# Patient Record
Sex: FEMALE | Race: WHITE | NOT HISPANIC OR LATINO | Employment: OTHER | URBAN - METROPOLITAN AREA
[De-identification: names, ages, dates, MRNs, and addresses within clinical notes are randomized per-mention and may not be internally consistent; named-entity substitution may affect disease eponyms.]

---

## 2017-06-23 DIAGNOSIS — Z79.890 POSTMENOPAUSAL HRT (HORMONE REPLACEMENT THERAPY): ICD-10-CM

## 2017-06-26 RX ORDER — ESTRADIOL 2 MG/1
TABLET ORAL
Qty: 30 TABLET | Refills: 0 | Status: SHIPPED | OUTPATIENT
Start: 2017-06-26 | End: 2017-07-17 | Stop reason: SDUPTHER

## 2017-07-17 ENCOUNTER — OFFICE VISIT (OUTPATIENT)
Dept: OBSTETRICS AND GYNECOLOGY | Facility: CLINIC | Age: 52
End: 2017-07-17

## 2017-07-17 VITALS
DIASTOLIC BLOOD PRESSURE: 80 MMHG | WEIGHT: 190.8 LBS | HEIGHT: 64 IN | SYSTOLIC BLOOD PRESSURE: 118 MMHG | BODY MASS INDEX: 32.58 KG/M2

## 2017-07-17 DIAGNOSIS — Z01.419 ENCOUNTER FOR GYNECOLOGICAL EXAMINATION WITHOUT ABNORMAL FINDING: ICD-10-CM

## 2017-07-17 DIAGNOSIS — N31.9 NEUROGENIC BLADDER: ICD-10-CM

## 2017-07-17 DIAGNOSIS — B37.31 VAGINAL CANDIDIASIS: ICD-10-CM

## 2017-07-17 DIAGNOSIS — N60.11 FIBROCYSTIC BREAST CHANGES, BILATERAL: ICD-10-CM

## 2017-07-17 DIAGNOSIS — N60.12 FIBROCYSTIC BREAST CHANGES, BILATERAL: ICD-10-CM

## 2017-07-17 DIAGNOSIS — Z79.890 POSTMENOPAUSAL HRT (HORMONE REPLACEMENT THERAPY): Primary | ICD-10-CM

## 2017-07-17 PROCEDURE — 99396 PREV VISIT EST AGE 40-64: CPT | Performed by: OBSTETRICS & GYNECOLOGY

## 2017-07-17 PROCEDURE — 83986 ASSAY PH BODY FLUID NOS: CPT | Performed by: OBSTETRICS & GYNECOLOGY

## 2017-07-17 PROCEDURE — 87210 SMEAR WET MOUNT SALINE/INK: CPT | Performed by: OBSTETRICS & GYNECOLOGY

## 2017-07-17 RX ORDER — FLUCONAZOLE 150 MG/1
150 TABLET ORAL EVERY OTHER DAY
Qty: 3 TABLET | Refills: 1 | Status: SHIPPED | OUTPATIENT
Start: 2017-07-17 | End: 2017-07-22

## 2017-07-17 RX ORDER — CHLORHEXIDINE GLUCONATE 0.12 MG/ML
RINSE ORAL
Refills: 0 | COMMUNITY
Start: 2017-06-02 | End: 2018-07-24

## 2017-07-17 RX ORDER — DOXYCYCLINE HYCLATE 100 MG/1
1 CAPSULE ORAL 2 TIMES DAILY
Refills: 0 | COMMUNITY
Start: 2017-07-04 | End: 2018-07-24

## 2017-07-17 RX ORDER — ESTRADIOL 2 MG/1
2 TABLET ORAL DAILY
Qty: 90 TABLET | Refills: 3 | Status: SHIPPED | OUTPATIENT
Start: 2017-07-17 | End: 2018-07-24 | Stop reason: DRUGHIGH

## 2017-07-17 NOTE — PROGRESS NOTES
"   Chief Complaint   Patient presents with   • Gynecologic Exam   • Med Refill       Padmini Cochran is a 52 y.o. year old  presenting to be seen for her annual exam.This patient has a history of a robotically-assisted T LH/BSO/VCS/posterior repair.  She has a neurogenic bladder and does self catheterization without a history of cystitis.  She is currently on antibiotics for an infected sebaceous cyst of the right chest wall.  She complains of vaginal irritation and discharge.    SCREENING TESTS    Year 2012   Age                         PAP                         HPV high risk                         Mammogram     benign                    LUCIO score                         Breast MRI                         Lipids                         Vitamin D                         Colonoscopy                         DEXA  Frax (hip/any)                         Ovarian Screen                           Enter the month test was performed.  If month not known, enter \"X'  · Black numbers = normal results  · Red numbers = abnormal results  · Black X = patient reported normal  · Red X - patient reported abnormal      Referred by:    Profession:    Other info:        She exercises regularly: yes.  She wears her seat belt: yes.  She has concerns about domestic violence: no.  She has noticed changes in height: no    GYN screening history:  · Last mammogram: was done on approximately 2016 and the result was: Birads II (Benign findings)..    No Additional Complaints Reported    The following portions of the patient's history were reviewed and updated as appropriate:vital signs and   She  does not have any pertinent problems on file.  She  has a past surgical history that includes Laparoscopic tubal ligation; Spinal cord untethering; Enterocele repair; Posterior repair; Perineoplasty; Endometrial ablation; Dilation and " curettage of uterus; Pelvic laparoscopy; total laparoscopic hysterectomy salpingo oophorectomy (07/2014); and Oophorectomy (Bilateral, 07/2014).  Her family history includes Alzheimer's disease in her paternal grandmother; Cancer in her maternal grandmother; Colon cancer in her maternal grandmother; Coronary artery disease in her mother; Diabetes in her mother; Hypertension in her father; Lymphoma in her father; Pancreatic cancer in her mother. There is no history of Breast cancer or Ovarian cancer.  She  reports that she has never smoked. She has never used smokeless tobacco. She reports that she does not drink alcohol or use illicit drugs.  Current Outpatient Prescriptions   Medication Sig Dispense Refill   • atorvastatin (LIPITOR) 40 MG tablet      • buPROPion (WELLBUTRIN) 75 MG tablet      • calcium carbonate (OS-ANNITA) 600 MG tablet Take 600 mg by mouth daily.     • carvedilol (COREG) 6.25 MG tablet      • chlorhexidine (PERIDEX) 0.12 % solution Rinse with 1/2 ounce by mouth for 30 seconds then spit out. use twice a day  0   • cholecalciferol (VITAMIN D3) 1000 UNITS tablet Take 1,000 Units by mouth daily.     • diazepam (VALIUM) 5 MG tablet   0   • doxycycline (VIBRAMYCIN) 100 MG capsule Take 1 capsule by mouth 2 (Two) Times a Day.  0   • estradiol (ESTRACE) 2 MG tablet Take 1 tablet by mouth Daily. 90 tablet 3   • fluconazole (DIFLUCAN) 150 MG tablet Take 1 tablet by mouth Every Other Day for 3 doses. 1 Every other day 3 tablet 1   • omeprazole (PriLOSEC) 20 MG capsule        No current facility-administered medications for this visit.      She is allergic to levaquin [levofloxacin]..    Review of Systems  A comprehensive review of systems was taken.  Constitutional: negative for fever, chills, activity change, appetite change, fatigue and unexpected weight change.  Respiratory: negative  Cardiovascular: negative  Gastrointestinal: negative  Genitourinary:positive for urinary  "retention  Musculoskeletal:negative  Behavioral/Psych: negative       /80  Ht 64\" (162.6 cm)  Wt 190 lb 12.8 oz (86.5 kg)  LMP  (LMP Unknown)  BMI 32.75 kg/m2    Physical Exam    General:  alert; cooperative; well developed; well nourished   Skin:  No suspicious lesions seen   Thyroid: normal to inspection and palpation   Lungs:  clear to auscultation bilaterally   Heart:  regular rate and rhythm, S1, S2 normal, no murmur, click, rub or gallop   Breasts:  Examined in supine position  Symmetric without masses or skin dimpling  Nipples normal without inversion, lesions or discharge  There are no palpable axillary nodes  Fibrocystic changes are present both breasts without a discrete mass   Abdomen: soft, non-tender; no masses  no umbilical or inginual hernias are present  no hepato-splenomegaly   Pelvis: Clinical staff was present for exam  External genitalia:  normal appearance of the external genitalia including Bartholin's and [place]'s glands.  Vaginal:  discharge present -  white and thick; pH = 4.0 wet prep done: pseudo-hyphae are present and free buds are present;  Cervix:  absent.  Uterus:  absent.  Adnexa:  absent, bilateral.  Rectal:  anus visually normal appearing. recto-vaginal exam unremarkable and confirms findings;     Lab Review   No data reviewed    Imaging  Mammogram results- benign         ASSESSMENT  Problems Addressed this Visit        Genitourinary    Neurogenic bladder    Postmenopausal HRT (hormone replacement therapy) - Primary    Relevant Medications    estradiol (ESTRACE) 2 MG tablet       Other    Fibrocystic breast changes, bilateral      Other Visit Diagnoses     Encounter for gynecological examination without abnormal finding        Vaginal candidiasis        Relevant Medications    fluconazole (DIFLUCAN) 150 MG tablet          PLAN    Medications prescribed this encounter:    New Medications Ordered This Visit   Medications   • chlorhexidine (PERIDEX) 0.12 % solution     Sig: " Rinse with 1/2 ounce by mouth for 30 seconds then spit out. use twice a day     Refill:  0   • doxycycline (VIBRAMYCIN) 100 MG capsule     Sig: Take 1 capsule by mouth 2 (Two) Times a Day.     Refill:  0   • estradiol (ESTRACE) 2 MG tablet     Sig: Take 1 tablet by mouth Daily.     Dispense:  90 tablet     Refill:  3   • fluconazole (DIFLUCAN) 150 MG tablet     Sig: Take 1 tablet by mouth Every Other Day for 3 doses. 1 Every other day     Dispense:  3 tablet     Refill:  1     QOD   ·   · Calcium, 600 mg/ Vit. D, 400 IU daily; regular weight-bearing exercise  · Follow up: 12 month(s)  *Please note that portions of this documentation may have been completed with a voice recognition program.  Efforts were made to edit this dictation, but occasional words may have been mistranscribed.       This note was electronically signed.    PACO Padilla MD  July 17, 2017  3:25 PM

## 2017-10-02 ENCOUNTER — TRANSCRIBE ORDERS (OUTPATIENT)
Dept: ADMINISTRATIVE | Facility: HOSPITAL | Age: 52
End: 2017-10-02

## 2017-10-02 DIAGNOSIS — Z12.39 BREAST SCREENING: Primary | ICD-10-CM

## 2017-11-10 ENCOUNTER — HOSPITAL ENCOUNTER (OUTPATIENT)
Dept: MAMMOGRAPHY | Facility: HOSPITAL | Age: 52
Discharge: HOME | End: 2017-11-10
Attending: OBSTETRICS & GYNECOLOGY | Admitting: OBSTETRICS & GYNECOLOGY

## 2017-11-10 DIAGNOSIS — Z12.39 BREAST SCREENING: ICD-10-CM

## 2017-11-10 PROCEDURE — 77063 BREAST TOMOSYNTHESIS BI: CPT

## 2017-11-10 PROCEDURE — 77067 SCR MAMMO BI INCL CAD: CPT | Performed by: RADIOLOGY

## 2017-11-10 PROCEDURE — G0202 SCR MAMMO BI INCL CAD: HCPCS

## 2017-11-10 PROCEDURE — 77063 BREAST TOMOSYNTHESIS BI: CPT | Performed by: RADIOLOGY

## 2018-07-24 ENCOUNTER — OFFICE VISIT (OUTPATIENT)
Dept: OBSTETRICS AND GYNECOLOGY | Facility: CLINIC | Age: 53
End: 2018-07-24

## 2018-07-24 VITALS
WEIGHT: 167.6 LBS | SYSTOLIC BLOOD PRESSURE: 128 MMHG | BODY MASS INDEX: 28.61 KG/M2 | DIASTOLIC BLOOD PRESSURE: 82 MMHG | HEIGHT: 64 IN

## 2018-07-24 DIAGNOSIS — Z01.419 ENCOUNTER FOR GYNECOLOGICAL EXAMINATION WITHOUT ABNORMAL FINDING: ICD-10-CM

## 2018-07-24 DIAGNOSIS — Z79.890 POSTMENOPAUSAL HRT (HORMONE REPLACEMENT THERAPY): Primary | ICD-10-CM

## 2018-07-24 PROCEDURE — 99396 PREV VISIT EST AGE 40-64: CPT | Performed by: OBSTETRICS & GYNECOLOGY

## 2018-07-24 RX ORDER — CHLORAL HYDRATE 500 MG
1000 CAPSULE ORAL
COMMUNITY
End: 2020-08-05

## 2018-07-24 RX ORDER — ESTRADIOL 1 MG/1
1.5 TABLET ORAL DAILY
Qty: 45 TABLET | Refills: 12 | Status: SHIPPED | OUTPATIENT
Start: 2018-07-24 | End: 2018-08-23

## 2018-07-24 NOTE — PROGRESS NOTES
Chief Complaint   Patient presents with   • Gynecologic Exam   • Med Refill       Padmini Cochran is a 53 y.o. year old  presenting to be seen for her annual exam.  This patient has a history of tubal sterilization; hysteroscopy/D&C/endometrial ablation; a robotically-assisted TLH/BSO/VCS; and a posterior/enterocele repair.  She currently takes estradiol, 2 mg daily and has no menopausal symptoms.  She denies side effects on estrogen replacement therapy.  She denies urinary symptoms.  She is scheduled for a partial colectomy for a sessile polyp of the ascending colon.    SCREENING TESTS    Year 2012   Age                         PAP  Neg.                       HPV high risk                         Mammogram     benign benign                   LUCIO score                         Breast MRI                         Lipids                         Vitamin D                         Colonoscopy                         DEXA  Frax (hip/any)                         Ovarian Screen                             She exercises regularly: yes.  She wears her seat belt: yes.  She has concerns about domestic violence: no.  She has noticed changes in height: no    GYN screening history:  · Last mammogram: was done on approximately 11/10/2017 and the result was: Birads I (Normal)..    No Additional Complaints Reported    The following portions of the patient's history were reviewed and updated as appropriate:vital signs and   She  has a past medical history of Colon polyp; Depression; Fibrocystic breast changes, bilateral; GERD (gastroesophageal reflux disease); Hyperlipidemia; Hypertension; Neurogenic bladder; Tethered spinal cord (CMS/HCC); and Urinary retention.  She  does not have any pertinent problems on file.  She  has a past surgical history that includes Laparoscopic tubal ligation; Spinal cord untethering;  "Enterocele repair; Posterior repair; Perineoplasty; Endometrial ablation; Dilation and curettage of uterus; Pelvic laparoscopy; total laparoscopic hysterectomy salpingo oophorectomy (07/2014); and Oophorectomy (Bilateral, 07/2014).  Her family history includes Alzheimer's disease in her paternal grandmother; Cancer in her maternal grandmother; Colon cancer in her maternal grandmother; Coronary artery disease in her mother; Diabetes in her mother; Hypertension in her father; Lymphoma in her father; Pancreatic cancer in her mother.  She  reports that she has never smoked. She has never used smokeless tobacco. She reports that she does not drink alcohol or use drugs.  Current Outpatient Prescriptions   Medication Sig Dispense Refill   • Omega-3 Fatty Acids (FISH OIL) 1000 MG capsule capsule Take 1,000 mg by mouth Daily With Breakfast.     • atorvastatin (LIPITOR) 40 MG tablet      • buPROPion (WELLBUTRIN) 75 MG tablet      • calcium carbonate (OS-ANNITA) 600 MG tablet Take 600 mg by mouth daily.     • carvedilol (COREG) 6.25 MG tablet      • cholecalciferol (VITAMIN D3) 1000 UNITS tablet Take 1,000 Units by mouth daily.     • diazepam (VALIUM) 5 MG tablet   0   • estradiol (ESTRACE) 1 MG tablet Take 1.5 tablets by mouth Daily for 30 days. 45 tablet 12   • omeprazole (PriLOSEC) 20 MG capsule        No current facility-administered medications for this visit.      She is allergic to levaquin [levofloxacin]..    Review of Systems  A comprehensive review of systems was taken.  Constitutional: negative for fever, chills, activity change, appetite change, fatigue and unexpected weight change.  Respiratory: negative  Cardiovascular: negative  Gastrointestinal: negative  Genitourinary:negative  Musculoskeletal:negative  Behavioral/Psych: negative       /82   Ht 162.6 cm (64\")   Wt 76 kg (167 lb 9.6 oz)   LMP  (LMP Unknown)   BMI 28.77 kg/m²     Physical Exam    General:  alert; cooperative; well developed; well " nourished   Skin:  No suspicious lesions seen   Thyroid: normal to inspection and palpation   Lungs:  clear to auscultation bilaterally   Heart:  regular rate and rhythm, S1, S2 normal, no murmur, click, rub or gallop   Breasts:  Examined in supine position  Symmetric without masses or skin dimpling  Nipples normal without inversion, lesions or discharge  There are no palpable axillary nodes   Abdomen: soft, non-tender; no masses  no umbilical or inginual hernias are present  no hepato-splenomegaly   Pelvis: Clinical staff was present for exam  External genitalia:  normal appearance of the external genitalia including Bartholin's and [place]'s glands.  Vaginal:  normal pink mucosa without prolapse or lesions.  Cervix:  absent.  Uterus:  absent.  Adnexa:  absent, bilateral.  Rectal:  anus visually normal appearing. recto-vaginal exam unremarkable and confirms findings;     Lab Review   No data reviewed    Imaging  Mammogram results- Birads I         ASSESSMENT  Problems Addressed this Visit        Genitourinary    Postmenopausal HRT (hormone replacement therapy) - Primary    Relevant Medications    estradiol (ESTRACE) 1 MG tablet      Other Visit Diagnoses     Encounter for gynecological examination without abnormal finding        Relevant Orders    Liquid-based Pap Smear, Screening          PLAN    Medications prescribed this encounter:    New Medications Ordered This Visit   Medications   • estradiol (ESTRACE) 1 MG tablet     Sig: Take 1.5 tablets by mouth Daily for 30 days.     Dispense:  45 tablet     Refill:  12   · Pap test of vagina done  · Monthly self breast assessment and annual breast imaging  · The patient will contact me if her decreased dose of estradiol is not effective in relieving symptoms  · Calcium, 600 mg/ Vit. D, 400 IU daily; regular weight-bearing exercise  · Follow up: 12 month(s)  *Please note that portions of this documentation may have been completed with a voice recognition program.  Efforts  were made to edit this dictation, but occasional words may have been mistranscribed.       This note was electronically signed.    PACO Padilla MD  July 24, 2018  10:59 AM

## 2018-10-29 ENCOUNTER — TRANSCRIBE ORDERS (OUTPATIENT)
Dept: OBSTETRICS AND GYNECOLOGY | Facility: CLINIC | Age: 53
End: 2018-10-29

## 2018-10-29 DIAGNOSIS — Z12.31 VISIT FOR SCREENING MAMMOGRAM: Primary | ICD-10-CM

## 2018-12-06 ENCOUNTER — APPOINTMENT (OUTPATIENT)
Dept: MAMMOGRAPHY | Facility: HOSPITAL | Age: 53
End: 2018-12-06
Attending: OBSTETRICS & GYNECOLOGY

## 2018-12-07 ENCOUNTER — HOSPITAL ENCOUNTER (OUTPATIENT)
Dept: MAMMOGRAPHY | Facility: HOSPITAL | Age: 53
Discharge: HOME | End: 2018-12-07
Attending: OBSTETRICS & GYNECOLOGY | Admitting: OBSTETRICS & GYNECOLOGY

## 2018-12-07 DIAGNOSIS — Z12.31 VISIT FOR SCREENING MAMMOGRAM: ICD-10-CM

## 2018-12-07 PROCEDURE — 77067 SCR MAMMO BI INCL CAD: CPT

## 2018-12-07 PROCEDURE — 77063 BREAST TOMOSYNTHESIS BI: CPT

## 2018-12-07 PROCEDURE — 77067 SCR MAMMO BI INCL CAD: CPT | Performed by: RADIOLOGY

## 2018-12-07 PROCEDURE — 77063 BREAST TOMOSYNTHESIS BI: CPT | Performed by: RADIOLOGY

## 2019-01-07 RX ORDER — ATORVASTATIN CALCIUM 40 MG/1
TABLET, FILM COATED ORAL
Qty: 90 TABLET | Refills: 3 | Status: SHIPPED | OUTPATIENT
Start: 2019-01-07 | End: 2020-04-20

## 2019-01-07 RX ORDER — CARVEDILOL 6.25 MG/1
TABLET ORAL
Qty: 180 TABLET | Refills: 3 | Status: SHIPPED | OUTPATIENT
Start: 2019-01-07 | End: 2020-04-20

## 2019-01-07 RX ORDER — BUPROPION HYDROCHLORIDE 75 MG/1
TABLET ORAL
Qty: 180 TABLET | Refills: 3 | Status: SHIPPED | OUTPATIENT
Start: 2019-01-07 | End: 2020-02-18 | Stop reason: SDUPTHER

## 2019-01-28 PROBLEM — F33.0 MAJOR DEPRESSIVE DISORDER, RECURRENT, MILD (HCC): Status: ACTIVE | Noted: 2019-01-28

## 2019-01-28 PROBLEM — K21.9 GASTROESOPHAGEAL REFLUX DISEASE WITHOUT ESOPHAGITIS: Status: ACTIVE | Noted: 2019-01-28

## 2019-01-28 PROBLEM — I10 BENIGN ESSENTIAL HYPERTENSION: Status: ACTIVE | Noted: 2019-01-28

## 2019-01-28 PROBLEM — F41.1 GENERALIZED ANXIETY DISORDER: Status: ACTIVE | Noted: 2019-01-28

## 2019-01-28 PROBLEM — E78.2 MIXED HYPERLIPIDEMIA: Status: ACTIVE | Noted: 2019-01-28

## 2019-01-28 PROBLEM — F41.9 ANXIETY: Status: ACTIVE | Noted: 2019-01-28

## 2019-01-29 ENCOUNTER — OFFICE VISIT (OUTPATIENT)
Dept: INTERNAL MEDICINE | Facility: CLINIC | Age: 54
End: 2019-01-29

## 2019-01-29 VITALS
SYSTOLIC BLOOD PRESSURE: 130 MMHG | BODY MASS INDEX: 25.27 KG/M2 | DIASTOLIC BLOOD PRESSURE: 90 MMHG | HEIGHT: 64 IN | WEIGHT: 148 LBS | TEMPERATURE: 98 F | HEART RATE: 62 BPM

## 2019-01-29 DIAGNOSIS — N31.9 NEUROGENIC BLADDER: ICD-10-CM

## 2019-01-29 DIAGNOSIS — I10 ESSENTIAL HYPERTENSION: Primary | ICD-10-CM

## 2019-01-29 DIAGNOSIS — K21.9 GASTROESOPHAGEAL REFLUX DISEASE WITHOUT ESOPHAGITIS: ICD-10-CM

## 2019-01-29 DIAGNOSIS — E78.2 MIXED HYPERLIPIDEMIA: ICD-10-CM

## 2019-01-29 LAB
ALBUMIN SERPL-MCNC: 4.32 G/DL (ref 3.2–4.8)
ALBUMIN/GLOB SERPL: 2.1 G/DL (ref 1.5–2.5)
ALP SERPL-CCNC: 87 U/L (ref 25–100)
ALT SERPL W P-5'-P-CCNC: 22 U/L (ref 7–40)
ANION GAP SERPL CALCULATED.3IONS-SCNC: 7 MMOL/L (ref 3–11)
ARTICHOKE IGE QN: 111 MG/DL (ref 0–130)
AST SERPL-CCNC: 17 U/L (ref 0–33)
BILIRUB SERPL-MCNC: 0.5 MG/DL (ref 0.3–1.2)
BUN BLD-MCNC: 16 MG/DL (ref 9–23)
BUN/CREAT SERPL: 19.3 (ref 7–25)
CALCIUM SPEC-SCNC: 9.5 MG/DL (ref 8.7–10.4)
CHLORIDE SERPL-SCNC: 103 MMOL/L (ref 99–109)
CHOLEST SERPL-MCNC: 182 MG/DL (ref 0–200)
CO2 SERPL-SCNC: 28 MMOL/L (ref 20–31)
CREAT BLD-MCNC: 0.83 MG/DL (ref 0.6–1.3)
GFR SERPL CREATININE-BSD FRML MDRD: 72 ML/MIN/1.73
GLOBULIN UR ELPH-MCNC: 2.1 GM/DL
GLUCOSE BLD-MCNC: 87 MG/DL (ref 70–100)
HDLC SERPL-MCNC: 48 MG/DL (ref 40–60)
POTASSIUM BLD-SCNC: 4.4 MMOL/L (ref 3.5–5.5)
PROT SERPL-MCNC: 6.4 G/DL (ref 5.7–8.2)
SODIUM BLD-SCNC: 138 MMOL/L (ref 132–146)
TRIGL SERPL-MCNC: 149 MG/DL (ref 0–150)

## 2019-01-29 PROCEDURE — 36415 COLL VENOUS BLD VENIPUNCTURE: CPT | Performed by: INTERNAL MEDICINE

## 2019-01-29 PROCEDURE — 80061 LIPID PANEL: CPT | Performed by: INTERNAL MEDICINE

## 2019-01-29 PROCEDURE — 99214 OFFICE O/P EST MOD 30 MIN: CPT | Performed by: INTERNAL MEDICINE

## 2019-01-29 PROCEDURE — 80053 COMPREHEN METABOLIC PANEL: CPT | Performed by: INTERNAL MEDICINE

## 2019-01-29 RX ORDER — ACYCLOVIR 400 MG/1
400 TABLET ORAL 3 TIMES DAILY
Qty: 15 TABLET | Refills: 2 | Status: SHIPPED | OUTPATIENT
Start: 2019-01-29 | End: 2019-04-16 | Stop reason: SDUPTHER

## 2019-01-29 RX ORDER — INFLUENZA A VIRUS A/MICHIGAN/45/2015 X-275 (H1N1) ANTIGEN (FORMALDEHYDE INACTIVATED), INFLUENZA A VIRUS A/SINGAPORE/INFIMH-16-0019/2016 IVR-186 (H3N2) ANTIGEN (FORMALDEHYDE INACTIVATED), INFLUENZA B VIRUS B/PHUKET/3073/2013 ANTIGEN (FORMALDEHYDE INACTIVATED), AND INFLUENZA B VIRUS B/MARYLAND/15/2016 BX-69A ANTIGEN (FORMALDEHYDE INACTIVATED) 15; 15; 15; 15 UG/.5ML; UG/.5ML; UG/.5ML; UG/.5ML
INJECTION, SUSPENSION INTRAMUSCULAR
Refills: 0 | COMMUNITY
Start: 2018-11-24 | End: 2019-07-30

## 2019-01-29 RX ORDER — ESTRADIOL 1 MG/1
1.5 TABLET ORAL
Refills: 1 | COMMUNITY
Start: 2019-01-26 | End: 2019-07-30 | Stop reason: SDUPTHER

## 2019-01-29 NOTE — PROGRESS NOTES
[facility]     Padmini Cochran  1965   [id number]      Patient Care Team:  Ronnie Mas MD as PCP - General (Internal Medicine)  Casey Padilla MD as Consulting Physician (Gynecology)    Chief Complaint::   Chief Complaint   Patient presents with   • Follow-up     hypertension,hyperlipidemia, depression    And 44 pound weight loss with diet and exercise.        HPI  Patient is 53-year-old female is been on a weight watcher's diet for the past year with exercise and has lost 44 pounds her BMI down to 25.4 she feels well is currently on a maintenance diet at this time.  She continues her atorvastatin 40 or Wellbutrin.  She continues her omeprazole Coreg has occasional cold sore and would like a new prescription for the acyclovir 403 times a day.    Chronic Conditions:  Patient is 3-year-old female for dressing chronic conditions #1 obesity over the past year the patient is lost approximately 44 pounds on Weight [employer] she's now on maintenance plan and doing well discussed her neurogenic bladder and need for daily catheterization with her mixed hyperlipidemia chronic stress anxiety on Wellbutrin and Valium    Patient Active Problem List   Diagnosis   • Urinary retention   • Tethered spinal cord (CMS/HCC)   • Neurogenic bladder   • Hypertension   • Hyperlipidemia   • GERD (gastroesophageal reflux disease)   • Depression   • Postmenopausal HRT (hormone replacement therapy)   • Irritable bowel syndrome with diarrhea   • Fibrocystic breast changes, bilateral   • Colon polyp   • Mixed hyperlipidemia   • Benign essential hypertension   • Generalized anxiety disorder   • Gastroesophageal reflux disease without esophagitis   • Major depressive disorder, recurrent, mild (CMS/HCC)   • Anxiety        Past Medical History:   Diagnosis Date   • Colon polyp     nodular fold sessile serrated polyp   • Depression    • GERD (gastroesophageal reflux disease)    • Hyperlipidemia    • Hypertension    •  Neurogenic bladder    • Tethered spinal cord (CMS/HCC)    • Urinary retention        Past Surgical History:   Procedure Laterality Date   • COLONOSCOPY     • DILATATION AND CURETTAGE     • ENDOMETRIAL ABLATION     • ENTEROCELE REPAIR     • LAPAROSCOPIC TUBAL LIGATION     • OOPHORECTOMY Bilateral 07/2014   • PELVIC LAPAROSCOPY     • PERINEOPLASTY     • POSTERIOR REPAIR     • SPINAL CORD UNTETHERING     • SPINE SURGERY     • TOTAL LAPAROSCOPIC HYSTERECTOMY SALPINGO OOPHORECTOMY  07/2014    BSO   • TUBAL ABDOMINAL LIGATION         Family History   Problem Relation Age of Onset   • Cancer Maternal Grandmother         COLON   • Colon cancer Maternal Grandmother    • Coronary artery disease Maternal Grandmother    • Lymphoma Father    • Hypertension Father    • Pancreatic cancer Mother    • Diabetes Mother    • Coronary artery disease Mother    • Alzheimer's disease Paternal Grandmother    • Coronary artery disease Maternal Grandfather    • Breast cancer Neg Hx    • Ovarian cancer Neg Hx        Social History     Socioeconomic History   • Marital status:      Spouse name: Not on file   • Number of children: Not on file   • Years of education: Not on file   • Highest education level: Not on file   Social Needs   • Financial resource strain: Not on file   • Food insecurity - worry: Not on file   • Food insecurity - inability: Not on file   • Transportation needs - medical: Not on file   • Transportation needs - non-medical: Not on file   Occupational History   • Not on file   Tobacco Use   • Smoking status: Never Smoker   • Smokeless tobacco: Never Used   Substance and Sexual Activity   • Alcohol use: No   • Drug use: No   • Sexual activity: Yes     Partners: Male     Birth control/protection: Surgical   Other Topics Concern   • Not on file   Social History Narrative   • Not on file       Allergies   Allergen Reactions   • Hydrochlorothiazide Other (See Comments)     Multiple symptoms   • Levaquin [Levofloxacin]  "Confusion   • Triamterene Other (See Comments)     Multiple sympmtoms       Review of Systems    HEENT: Denies headache dizziness lightheadedness syncope or concussion  NECK:  Denies neck pain stiffness or swelling or dysphagia  CHEST: Denies cough or wheeze  CARDIAC: Denies chest pain pressure tightness palpitations  ABD: Denies nausea vomiting  : Neurogenic bladder having to Self 2-3 times per day  NEURO:  Denies syncope or concussion or neuropathy  PSYCH:  Complaint of mild stress anxiety currently stable on Wellbutrin and Valium  EXTREM: Has mild arthritic change denies edema    Vital Signs  Vitals:    01/29/19 0958   BP: 130/90   BP Location: Left arm   Patient Position: Sitting   Cuff Size: Adult   Pulse: 62   Temp: 98 °F (36.7 °C)   TempSrc: Temporal   Weight: 67.1 kg (148 lb)  Comment: without shoes   Height: 162.6 cm (64.02\")   PainSc: 0-No pain         Current Outpatient Medications:   •  atorvastatin (LIPITOR) 40 MG tablet, TAKE 1 TABLET DAILY        (INCREASED DOSE), Disp: 90 tablet, Rfl: 3  •  buPROPion (WELLBUTRIN) 75 MG tablet, TAKE 2 TABLETS DAILY, Disp: 180 tablet, Rfl: 3  •  calcium carbonate (OS-ANNITA) 600 MG tablet, Take 600 mg by mouth daily., Disp: , Rfl:   •  carvedilol (COREG) 6.25 MG tablet, TAKE 1 TABLET 2 TIMES DAILYWITH FOOD, Disp: 180 tablet, Rfl: 3  •  cholecalciferol (VITAMIN D3) 1000 UNITS tablet, Take 1,000 Units by mouth daily., Disp: , Rfl:   •  diazepam (VALIUM) 5 MG tablet, , Disp: , Rfl: 0  •  estradiol (ESTRACE) 1 MG tablet, , Disp: , Rfl: 1  •  FLUZONE QUADRIVALENT 0.5 ML suspension prefilled syringe injection, inject 0.5 milliliter intramuscularly, Disp: , Rfl: 0  •  Omega-3 Fatty Acids (FISH OIL) 1000 MG capsule capsule, Take 1,000 mg by mouth Daily With Breakfast., Disp: , Rfl:   •  omeprazole (PriLOSEC) 20 MG capsule, , Disp: , Rfl:   •  acyclovir (ZOVIRAX) 400 MG tablet, Take 1 tablet by mouth 3 (Three) Times a Day. Take no more than 5 doses a day., Disp: 15 tablet, Rfl: " 2    Physical Exam:  HEENT: Pupils equal reactive no facial asymmetry  NECK:  Normal without masses or bruits  CHEST: Clear to P&A  CARDIAC: Regular rhythm without gallop rub or click  ABD: Flat soft without masses liver spleen are normal positive bowel sounds  : Deferred  NEURO:  Intact  PSYCH:  Stable stress anxiety on Wellbutrin and Valium  EXTREM: Arthritic changes no edema  Skin: Clear     Results Review:    Fasting CMP and lipid are pending  Procedures    Medication Review: Medications reviewed and noted    Patient wellness counseling  Exercise: Patient active is active walking daily to help with her weight loss encouraged continue same  Diet: Follow a Weight Watchers maintenance program  Smoking: None  Alcohol: None  Screening: Preventative screening discussed    Assessment/Plan:  #1 obesity with current weight down to 148 having lost 44 pounds in the past 1 year on Weight Watchers continue maintenance and exercise  Neurogenic bladder requiring self catheter 3 times a day  #3 chronic H S1 and prescription for acyclovir  #4 essential hypertension  #5 chronic GERD stable on omeprazole and improved since the 40 pound weight loss  #6 chronic anxiety stable on the Wellbutrin and Valium  #7 mixed hyperlipidemia stable on atorvastatin 40 fasting lab today pending    Pending fasting lab of CMP and lipid new prescription for her urinary catheters and new prescription for acyclovir see the patient back in 6 months or when necessary.  Patient Instructions   Renew prescriptions for urinary catheter and acyclovir  Fasting lipids CMP and lipid pending  Change in therapy  Continue excellent diet maintenance and lost 44 pounds at [employer]  See the patient back in 6 months or when necessary.       Plan of care reviewed with patient at the conclusion of today's visit. Education was provided regarding diagnosis, management, and any prescribed or recommended OTC medications.Patient verbalizes understanding of and  agreement with management plan.         Ronnie Mas MD

## 2019-01-29 NOTE — PATIENT INSTRUCTIONS
Renew prescriptions for urinary catheter and acyclovir  Fasting lipids CMP and lipid pending  Change in therapy  Continue excellent diet maintenance and lost 44 pounds at [employer]  See the patient back in 6 months or when necessary.

## 2019-04-17 RX ORDER — ACYCLOVIR 400 MG/1
TABLET ORAL
Qty: 15 TABLET | Refills: 2 | Status: SHIPPED | OUTPATIENT
Start: 2019-04-17 | End: 2019-07-30 | Stop reason: SDUPTHER

## 2019-07-22 PROBLEM — E66.9 OBESITY, UNSPECIFIED: Status: ACTIVE | Noted: 2019-07-22

## 2019-07-22 PROBLEM — E66.9 OBESITY (BMI 30.0-34.9): Status: ACTIVE | Noted: 2019-07-22

## 2019-07-22 PROBLEM — R07.9 CHEST PAIN, UNSPECIFIED: Status: ACTIVE | Noted: 2019-07-22

## 2019-07-22 PROBLEM — Z00.00 ENCOUNTER FOR ANNUAL HEALTH EXAMINATION: Status: ACTIVE | Noted: 2019-07-22

## 2019-07-22 PROBLEM — I73.00 RAYNAUD'S SYNDROME: Status: ACTIVE | Noted: 2019-07-22

## 2019-07-22 PROBLEM — Z86.69 HISTORY OF SPINAL CORD COMPRESSION: Status: ACTIVE | Noted: 2019-07-22

## 2019-07-22 PROBLEM — E66.811 OBESITY (BMI 30.0-34.9): Status: ACTIVE | Noted: 2019-07-22

## 2019-07-22 PROBLEM — E55.9 VITAMIN D DEFICIENCY: Status: ACTIVE | Noted: 2019-07-22

## 2019-07-22 PROBLEM — K21.00 ESOPHAGITIS, REFLUX: Status: ACTIVE | Noted: 2019-07-22

## 2019-07-30 ENCOUNTER — OFFICE VISIT (OUTPATIENT)
Dept: OBSTETRICS AND GYNECOLOGY | Facility: CLINIC | Age: 54
End: 2019-07-30

## 2019-07-30 ENCOUNTER — OFFICE VISIT (OUTPATIENT)
Dept: INTERNAL MEDICINE | Facility: CLINIC | Age: 54
End: 2019-07-30

## 2019-07-30 ENCOUNTER — LAB (OUTPATIENT)
Dept: LAB | Facility: HOSPITAL | Age: 54
End: 2019-07-30

## 2019-07-30 VITALS
BODY MASS INDEX: 25.27 KG/M2 | HEART RATE: 68 BPM | DIASTOLIC BLOOD PRESSURE: 90 MMHG | WEIGHT: 148 LBS | HEIGHT: 64 IN | SYSTOLIC BLOOD PRESSURE: 140 MMHG

## 2019-07-30 VITALS
SYSTOLIC BLOOD PRESSURE: 138 MMHG | DIASTOLIC BLOOD PRESSURE: 76 MMHG | WEIGHT: 148 LBS | HEIGHT: 64 IN | BODY MASS INDEX: 25.27 KG/M2

## 2019-07-30 DIAGNOSIS — Z01.419 ENCOUNTER FOR GYNECOLOGICAL EXAMINATION WITHOUT ABNORMAL FINDING: ICD-10-CM

## 2019-07-30 DIAGNOSIS — I10 ESSENTIAL HYPERTENSION: Primary | ICD-10-CM

## 2019-07-30 DIAGNOSIS — N60.12 FIBROCYSTIC BREAST CHANGES, BILATERAL: ICD-10-CM

## 2019-07-30 DIAGNOSIS — E78.2 MIXED HYPERLIPIDEMIA: ICD-10-CM

## 2019-07-30 DIAGNOSIS — Z79.890 POSTMENOPAUSAL HRT (HORMONE REPLACEMENT THERAPY): Primary | ICD-10-CM

## 2019-07-30 DIAGNOSIS — I10 ESSENTIAL HYPERTENSION: ICD-10-CM

## 2019-07-30 DIAGNOSIS — K58.0 IRRITABLE BOWEL SYNDROME WITH DIARRHEA: ICD-10-CM

## 2019-07-30 DIAGNOSIS — Z83.3 FAMILY HISTORY OF DIABETES MELLITUS: ICD-10-CM

## 2019-07-30 DIAGNOSIS — I73.00 RAYNAUD'S DISEASE WITHOUT GANGRENE: ICD-10-CM

## 2019-07-30 DIAGNOSIS — N81.5 VAGINAL ENTEROCELE: ICD-10-CM

## 2019-07-30 DIAGNOSIS — N31.9 NEUROGENIC BLADDER: ICD-10-CM

## 2019-07-30 DIAGNOSIS — I10 BENIGN ESSENTIAL HYPERTENSION: ICD-10-CM

## 2019-07-30 DIAGNOSIS — N60.11 FIBROCYSTIC BREAST CHANGES, BILATERAL: ICD-10-CM

## 2019-07-30 LAB
ALBUMIN SERPL-MCNC: 4.4 G/DL (ref 3.5–5.2)
ALBUMIN/GLOB SERPL: 1.3 G/DL
ALP SERPL-CCNC: 94 U/L (ref 39–117)
ALT SERPL W P-5'-P-CCNC: 19 U/L (ref 1–33)
ANION GAP SERPL CALCULATED.3IONS-SCNC: 12 MMOL/L (ref 5–15)
AST SERPL-CCNC: 16 U/L (ref 1–32)
BASOPHILS # BLD AUTO: 0.06 10*3/MM3 (ref 0–0.2)
BASOPHILS NFR BLD AUTO: 0.9 % (ref 0–1.5)
BILIRUB SERPL-MCNC: 0.2 MG/DL (ref 0.2–1.2)
BUN BLD-MCNC: 16 MG/DL (ref 6–20)
BUN/CREAT SERPL: 19.5 (ref 7–25)
CALCIUM SPEC-SCNC: 10 MG/DL (ref 8.6–10.5)
CHLORIDE SERPL-SCNC: 101 MMOL/L (ref 98–107)
CHOLEST SERPL-MCNC: 180 MG/DL (ref 0–200)
CO2 SERPL-SCNC: 28 MMOL/L (ref 22–29)
CREAT BLD-MCNC: 0.82 MG/DL (ref 0.57–1)
DEPRECATED RDW RBC AUTO: 41.9 FL (ref 37–54)
EOSINOPHIL # BLD AUTO: 0.27 10*3/MM3 (ref 0–0.4)
EOSINOPHIL NFR BLD AUTO: 4 % (ref 0.3–6.2)
ERYTHROCYTE [DISTWIDTH] IN BLOOD BY AUTOMATED COUNT: 12.1 % (ref 12.3–15.4)
GFR SERPL CREATININE-BSD FRML MDRD: 73 ML/MIN/1.73
GLOBULIN UR ELPH-MCNC: 3.5 GM/DL
GLUCOSE BLD-MCNC: 64 MG/DL (ref 65–99)
HBA1C MFR BLD: 5.7 % (ref 4.8–5.6)
HCT VFR BLD AUTO: 46 % (ref 34–46.6)
HDLC SERPL-MCNC: 57 MG/DL (ref 40–60)
HGB BLD-MCNC: 14.2 G/DL (ref 12–15.9)
IMM GRANULOCYTES # BLD AUTO: 0.02 10*3/MM3 (ref 0–0.05)
IMM GRANULOCYTES NFR BLD AUTO: 0.3 % (ref 0–0.5)
LDLC SERPL CALC-MCNC: 104 MG/DL (ref 0–100)
LDLC/HDLC SERPL: 1.82 {RATIO}
LYMPHOCYTES # BLD AUTO: 1.75 10*3/MM3 (ref 0.7–3.1)
LYMPHOCYTES NFR BLD AUTO: 25.7 % (ref 19.6–45.3)
MCH RBC QN AUTO: 28.9 PG (ref 26.6–33)
MCHC RBC AUTO-ENTMCNC: 30.9 G/DL (ref 31.5–35.7)
MCV RBC AUTO: 93.5 FL (ref 79–97)
MONOCYTES # BLD AUTO: 0.57 10*3/MM3 (ref 0.1–0.9)
MONOCYTES NFR BLD AUTO: 8.4 % (ref 5–12)
NEUTROPHILS # BLD AUTO: 4.15 10*3/MM3 (ref 1.7–7)
NEUTROPHILS NFR BLD AUTO: 60.7 % (ref 42.7–76)
NRBC BLD AUTO-RTO: 0 /100 WBC (ref 0–0.2)
PLATELET # BLD AUTO: 373 10*3/MM3 (ref 140–450)
PMV BLD AUTO: 9.5 FL (ref 6–12)
POTASSIUM BLD-SCNC: 4.5 MMOL/L (ref 3.5–5.2)
PROT SERPL-MCNC: 7.9 G/DL (ref 6–8.5)
RBC # BLD AUTO: 4.92 10*6/MM3 (ref 3.77–5.28)
SODIUM BLD-SCNC: 141 MMOL/L (ref 136–145)
TRIGL SERPL-MCNC: 97 MG/DL (ref 0–150)
TSH SERPL DL<=0.05 MIU/L-ACNC: 2.65 MIU/ML (ref 0.27–4.2)
VLDLC SERPL-MCNC: 19.4 MG/DL (ref 5–40)
WBC NRBC COR # BLD: 6.82 10*3/MM3 (ref 3.4–10.8)

## 2019-07-30 PROCEDURE — 80061 LIPID PANEL: CPT

## 2019-07-30 PROCEDURE — 99396 PREV VISIT EST AGE 40-64: CPT | Performed by: INTERNAL MEDICINE

## 2019-07-30 PROCEDURE — 84443 ASSAY THYROID STIM HORMONE: CPT

## 2019-07-30 PROCEDURE — 93000 ELECTROCARDIOGRAM COMPLETE: CPT | Performed by: INTERNAL MEDICINE

## 2019-07-30 PROCEDURE — 80053 COMPREHEN METABOLIC PANEL: CPT

## 2019-07-30 PROCEDURE — 85025 COMPLETE CBC W/AUTO DIFF WBC: CPT

## 2019-07-30 PROCEDURE — 83036 HEMOGLOBIN GLYCOSYLATED A1C: CPT

## 2019-07-30 PROCEDURE — 99396 PREV VISIT EST AGE 40-64: CPT | Performed by: OBSTETRICS & GYNECOLOGY

## 2019-07-30 RX ORDER — DIAZEPAM 5 MG/1
5 TABLET ORAL NIGHTLY PRN
Qty: 30 TABLET | Refills: 2 | Status: SHIPPED | OUTPATIENT
Start: 2019-07-30 | End: 2020-08-05 | Stop reason: SDUPTHER

## 2019-07-30 RX ORDER — ESTRADIOL 1 MG/1
1.5 TABLET ORAL DAILY
Qty: 45 TABLET | Refills: 11 | Status: SHIPPED | OUTPATIENT
Start: 2019-07-30 | End: 2020-07-29

## 2019-07-30 RX ORDER — ACYCLOVIR 400 MG/1
400 TABLET ORAL 3 TIMES DAILY
Qty: 15 TABLET | Refills: 2 | Status: SHIPPED | OUTPATIENT
Start: 2019-07-30 | End: 2020-02-28

## 2019-07-30 NOTE — PROGRESS NOTES
Chief Complaint   Patient presents with   • Gynecologic Exam     patient states that something is bulging from her vagina   • Med Refill       Padmini Cochran is a 54 y.o. year old  presenting to be seen for her annual exam.  This patient has previously had tubal sterilization; a hysteroscopy/D&C/endometrial ablation; a robotically-assisted TLH/BSO/VCS for uterine prolapse as well as a posterior/enterocele repair for a rectocele/enterocele.  The latter procedures were done in 2014.  She now complains of mild bulging in the vagina.  She has chronic constipation and has straining with bowel movements.  She has seen gastroenterology.  She denies urinary symptoms.  She currently takes estradiol, 1.5 mg daily for relief of menopausal symptoms.  She has no side effects on this medication.    SCREENING TESTS    Year 2012   Age                         PAP       Neg.                  HPV high risk                         Mammogram       benign                  LUCIO score                         Breast MRI                         Lipids                         Vitamin D                         Colonoscopy                         DEXA  Frax (hip/any)                         Ovarian Screen                             She exercises regularly: yes.  She wears her seat belt: yes.  She has concerns about domestic violence: no.  She has noticed changes in height: no    GYN screening history:  · Last mammogram: was done on approximately 2018 and the result was: Birads II (Benign findings)..    No Additional Complaints Reported    The following portions of the patient's history were reviewed and updated as appropriate:vital signs and   She  has a past medical history of Colon polyp, Depression, GERD (gastroesophageal reflux disease), Hyperlipidemia, Hypertension, Neurogenic bladder, Tethered spinal cord  (CMS/HCC), and Urinary retention.  She does not have any pertinent problems on file.  She  has a past surgical history that includes Laparoscopic tubal ligation; Spinal cord untethering; Enterocele repair; Posterior repair; Perineoplasty; Endometrial ablation; Dilation and curettage of uterus; Pelvic laparoscopy; total laparoscopic hysterectomy salpingo oophorectomy (07/2014); Oophorectomy (Bilateral, 07/2014); Colonoscopy (06/2012); Tubal ligation (04/1999); Spine surgery (2004); Laparoscopic hysterectomy (07/28/2014); and Endometrial ablation (2005).  Her family history includes Alzheimer's disease in her paternal grandmother; Cancer in her maternal grandmother; Colon cancer in her maternal grandmother; Coronary artery disease in her maternal grandfather, maternal grandmother, and mother; Diabetes in her mother; Hypertension in her father and mother; Lymphoma in her father; Migraines in her sister; Pancreatic cancer in her mother.  She  reports that she has never smoked. She has never used smokeless tobacco. She reports that she does not drink alcohol or use drugs.  Current Outpatient Medications   Medication Sig Dispense Refill   • acyclovir (ZOVIRAX) 400 MG tablet Take 1 tablet by mouth 3 (Three) Times a Day. Take no more than 5 doses a day. 15 tablet 2   • atorvastatin (LIPITOR) 40 MG tablet TAKE 1 TABLET DAILY        (INCREASED DOSE) 90 tablet 3   • buPROPion (WELLBUTRIN) 75 MG tablet TAKE 2 TABLETS DAILY 180 tablet 3   • calcium carbonate (OS-ANNITA) 600 MG tablet Take 600 mg by mouth daily.     • carvedilol (COREG) 6.25 MG tablet TAKE 1 TABLET 2 TIMES DAILYWITH FOOD 180 tablet 3   • cholecalciferol (VITAMIN D3) 1000 UNITS tablet Take 1,000 Units by mouth daily.     • diazePAM (VALIUM) 5 MG tablet Take 1 tablet by mouth At Night As Needed for Anxiety. 30 tablet 2   • estradiol (ESTRACE) 1 MG tablet Take 1.5 tablets by mouth Daily. 45 tablet 11   • Omega-3 Fatty Acids (FISH OIL) 1000 MG capsule capsule Take 1,000  "mg by mouth Daily With Breakfast.     • omeprazole (PriLOSEC) 20 MG capsule        No current facility-administered medications for this visit.      She is allergic to hydrochlorothiazide; levaquin [levofloxacin]; and triamterene..    Review of Systems  A comprehensive review of systems was taken.  Constitutional: negative for fever, chills, activity change, appetite change, fatigue and unexpected weight change.  Respiratory: negative  Cardiovascular: negative  Gastrointestinal: positive for constipation  Genitourinary:positive for bulging from vagina  Musculoskeletal:negative  Behavioral/Psych: negative       /76   Ht 162.6 cm (64\")   Wt 67.1 kg (148 lb)   Breastfeeding? No   BMI 25.40 kg/m²     Physical Exam    General:  alert; cooperative; well developed; well nourished   Skin:  No suspicious lesions seen   Thyroid: normal to inspection and palpation   Lungs:  clear to auscultation bilaterally   Heart:  regular rate and rhythm, S1, S2 normal, no murmur, click, rub or gallop   Breasts:  Examined in supine position  Symmetric without masses or skin dimpling  Nipples normal without inversion, lesions or discharge  There are no palpable axillary nodes   Abdomen: soft, non-tender; no masses  no umbilical or inguinal hernias are present  no hepato-splenomegaly   Pelvis: Clinical staff was present for exam  External genitalia:  normal appearance of the external genitalia including Bartholin's and [place]'s glands.  Vaginal:  normal pink mucosa without prolapse or lesions.  Cervix:  absent.  Uterus:  absent.  Adnexa:  absent, bilateral.  Rectal:  anus visually normal appearing. recto-vaginal exam unremarkable and confirms findings;  Rectocele GRADE 1  Enterocele GRADE 2     Lab Review   No data reviewed    Imaging  Mammogram results- Birads II         ASSESSMENT  Problems Addressed this Visit        Digestive    Vaginal enterocele       Genitourinary    Postmenopausal HRT (hormone replacement therapy) - Primary "    Relevant Medications    estradiol (ESTRACE) 1 MG tablet       Other    Fibrocystic breast changes, bilateral      Other Visit Diagnoses     Encounter for gynecological examination without abnormal finding              PLAN    Medications prescribed this encounter:    New Medications Ordered This Visit   Medications   • estradiol (ESTRACE) 1 MG tablet     Sig: Take 1.5 tablets by mouth Daily.     Dispense:  45 tablet     Refill:  11   · I have spent 12 minutes in face-to-face conversation with the patient counseling her about the recurrence of her enterocele.  I have counseled her that she must get her chronic constipation under control and avoid straining with bowel movements.  I have counseled her that if this occurs and if she can avoid doing heavy lifting; I would consider doing a repeat posterior/enterocele repair.  I have counseled her that she has a minimal rectocele and a grade 2 enterocele.  Counseled her to take Colace, 100 mg by mouth daily.  She was given pamphlets about pelvic support problems and about posterior repair.  · Calcium, 600 mg/ Vit. D, 400 IU daily; regular weight-bearing exercise  · Follow up: 12 month(s)  *Please note that portions of this documentation may have been completed with a voice recognition program.  Efforts were made to edit this dictation, but occasional words may have been mistranscribed.       This note was electronically signed.    PACO Padilla MD  July 30, 2019  12:06 PM

## 2019-07-30 NOTE — ASSESSMENT & PLAN NOTE
Patient is chronic irritable bowel with occasional diarrhea currently controlled no change in therapy  Last colonoscopy was 4/8/2019

## 2019-07-30 NOTE — ASSESSMENT & PLAN NOTE
Patient has history of mixed hyperlipidemia she is on atorvastatin 40 mg daily denies myalgia arthralgia fasting CMP and lipid are pending no change therapy.  However the patient has lost approximately 40 pounds and may need to reduce her atorvastatin.

## 2019-07-30 NOTE — ASSESSMENT & PLAN NOTE
History of essential hypertension with current blood pressure 138/76 repeated 132/76 patient is on Coreg 6.25 twice daily no change therapy.

## 2019-07-30 NOTE — PROGRESS NOTES
[facility]     Padmini Cochran  1965   [id number]      Patient Care Team:  Ronnie Mas MD as PCP - General (Internal Medicine)  Casey Padilla MD as Consulting Physician (Gynecology)    Chief Complaint::   Chief Complaint   Patient presents with   • Annual Exam     also has an appt with GYN today   • Med Refill     acyclovir, valium   • lab request     strong family history of diabetes.  Requesting A1c            HPI  Patient is a 54-year-old female with a history of at bedtime 1 oral lesions menopause urinary retention requiring self-catheterization history of mixed hyperlipidemia essential hypertension GERD and chronic stress depression she has lost a total of approximately 40 pounds since April 2018 feels well is walking and exercising she had no recent changes in medication or diet she has maintained this weight for the past 6 months at 148 pounds.      Patient Active Problem List   Diagnosis   • Urinary retention   • Tethered spinal cord (CMS/HCC)   • Neurogenic bladder   • GERD (gastroesophageal reflux disease)   • Depression   • Postmenopausal HRT (hormone replacement therapy)   • Irritable bowel syndrome with diarrhea   • Fibrocystic breast changes, bilateral   • Colon polyp   • Mixed hyperlipidemia   • Benign essential hypertension   • Generalized anxiety disorder   • Gastroesophageal reflux disease without esophagitis   • Major depressive disorder, recurrent, mild (CMS/HCC)   • Anxiety   • Esophagitis, reflux   • Obesity (BMI 30.0-34.9)   • Encounter for annual health examination   • History of spinal cord compression   • Vitamin D deficiency   • Raynaud's syndrome   • Chest pain, unspecified   • Family history of diabetes mellitus   • Hyperlipidemia   • Vaginal enterocele        Past Medical History:   Diagnosis Date   • Colon polyp     nodular fold sessile serrated polyp   • Depression    • GERD (gastroesophageal reflux disease)    • Hyperlipidemia    • Hypertension    •  Neurogenic bladder    • Tethered spinal cord (CMS/HCC)    • Urinary retention        Past Surgical History:   Procedure Laterality Date   • COLONOSCOPY  06/2012    Inflammed ICV   • DILATATION AND CURETTAGE     • ENDOMETRIAL ABLATION     • ENDOMETRIAL ABLATION  2005    UTERINE  ABLATION   • ENTEROCELE REPAIR     • LAPAROSCOPIC HYSTERECTOMY  07/28/2014   • LAPAROSCOPIC TUBAL LIGATION     • OOPHORECTOMY Bilateral 07/2014   • PELVIC LAPAROSCOPY     • PERINEOPLASTY     • POSTERIOR REPAIR     • SPINAL CORD UNTETHERING     • SPINE SURGERY  2004    SPINAL CORD SURG   • TOTAL LAPAROSCOPIC HYSTERECTOMY SALPINGO OOPHORECTOMY  07/2014    BSO   • TUBAL ABDOMINAL LIGATION  04/1999       Family History   Problem Relation Age of Onset   • Cancer Maternal Grandmother         COLON   • Colon cancer Maternal Grandmother    • Coronary artery disease Maternal Grandmother    • Lymphoma Father    • Hypertension Father    • Pancreatic cancer Mother    • Diabetes Mother    • Coronary artery disease Mother         premature; angioplasty 8 09   • Hypertension Mother    • Alzheimer's disease Paternal Grandmother    • Coronary artery disease Maternal Grandfather    • Migraines Sister    • Breast cancer Neg Hx    • Ovarian cancer Neg Hx        Social History     Socioeconomic History   • Marital status:      Spouse name: Not on file   • Number of children: Not on file   • Years of education: Not on file   • Highest education level: Not on file   Tobacco Use   • Smoking status: Never Smoker   • Smokeless tobacco: Never Used   Substance and Sexual Activity   • Alcohol use: No   • Drug use: No   • Sexual activity: Yes     Partners: Male     Birth control/protection: Surgical       Allergies   Allergen Reactions   • Hydrochlorothiazide Other (See Comments)     Multiple symptoms; DYAZIDE    • Levaquin [Levofloxacin] Confusion   • Triamterene Other (See Comments)     Multiple sympmtoms; DYAZIDE        Immunization History   Administered  "Date(s) Administered   • Flu Vaccine Intradermal Quad 18-64YR 11/24/2018   • Tdap 12/30/2015        Health Maintenance Due   Topic Date Due   • ZOSTER VACCINE (1 of 2) 03/19/2016        Review of Systems     HEENT: She denies headache or dizzy wears glasses  NECK: Denies pain stiffness swelling dysphasia or reflux  CHEST: Non-smoker denies cough wheeze or shortness of breath  CARDIAC: Denies chest pain pressure tightness or palpitations history of hypertension stable on Coreg 6.25 twice daily  ABD: Denies nausea vomiting had a colonoscopy April 8, 2019  : His urinary retention and neurogenic bladder requiring self cath 3-4 times per day  NEURO: Neurogenic bladder syncope concussion or neuropathy  PSYCH: Mild stress depression currently stable on Wellbutrin and Valium as needed  EXTREM: Mild arthritic changes without edema    Vital Signs  Vitals:    07/30/19 0917   BP: 140/90   BP Location: Left arm   Patient Position: Sitting   Cuff Size: Adult   Pulse: 68   Weight: 67.1 kg (148 lb)   Height: 162.6 cm (64\")   PainSc: 0-No pain     Body mass index is 25.4 kg/m².        Current Outpatient Medications:   •  acyclovir (ZOVIRAX) 400 MG tablet, Take 1 tablet by mouth 3 (Three) Times a Day. Take no more than 5 doses a day., Disp: 15 tablet, Rfl: 2  •  atorvastatin (LIPITOR) 40 MG tablet, TAKE 1 TABLET DAILY        (INCREASED DOSE), Disp: 90 tablet, Rfl: 3  •  buPROPion (WELLBUTRIN) 75 MG tablet, TAKE 2 TABLETS DAILY, Disp: 180 tablet, Rfl: 3  •  calcium carbonate (OS-ANNITA) 600 MG tablet, Take 600 mg by mouth daily., Disp: , Rfl:   •  carvedilol (COREG) 6.25 MG tablet, TAKE 1 TABLET 2 TIMES DAILYWITH FOOD, Disp: 180 tablet, Rfl: 3  •  cholecalciferol (VITAMIN D3) 1000 UNITS tablet, Take 1,000 Units by mouth daily., Disp: , Rfl:   •  diazePAM (VALIUM) 5 MG tablet, Take 1 tablet by mouth At Night As Needed for Anxiety., Disp: 30 tablet, Rfl: 2  •  Omega-3 Fatty Acids (FISH OIL) 1000 MG capsule capsule, Take 1,000 mg by mouth " Daily With Breakfast., Disp: , Rfl:   •  omeprazole (PriLOSEC) 20 MG capsule, , Disp: , Rfl:   •  estradiol (ESTRACE) 1 MG tablet, Take 1.5 tablets by mouth Daily., Disp: 45 tablet, Rfl: 11    Physical Exam   HEENT: Pupils equal reactive ENT clear no facial asymmetry pharynx is clear  NECK: Neck no masses bruits thyromegaly or neck vein distention  CHEST: Clear to P&A without rales wheezes or rhonchi  CARDIAC: Regular rhythm without gallop rub or click  ABD: Positive bowel sounds liver and spleen normal  :   NEURO: CNS is intact no neuropathy  PSYCH: Mild anxiety depression currently stable on Valium and Wellbutrin  EXTREM: Extremities warm and dry no edema  Skin: Clear     Results Review:    Fasting lab pending    ECG 12 Lead  Date/Time: 7/30/2019 6:21 PM  Performed by: Ronnie Mas MD  Authorized by: Ronnie Mas MD   Comparison: not compared with previous ECG   Rhythm: sinus rhythm  Rate: normal  Conduction: conduction normal  ST Segments: ST segments normal  T Waves: T waves normal  QRS axis: normal  Other: no other findings    Clinical impression: normal ECG  Comments: Sinus rhythm no ischemia        Patient Wellness Counseling:   Plan of care reviewed with patient at the conclusion of today's visit. Education was provided in regards to diagnosis, diet and exercise, cervical cancer screening, self breast exams, breast cancer screening, and the importance of yearly mammograms.   Nutrition, family planning/contraception, physical activity, healthy weight,ways to reduce stress, adequate sleep, injury prevention, misuse of tobacco, alcohol and drugs, sexual behavior and STD's, dental health, mental health, and immunizations.    Management and any prescribed or recommended OTC medications.  Patient verbalizes understanding of and agreement with management plan.      Medication Review: Medications reviewed and noted    Patient wellness counseling  Exercise: Walking exercise  Diet: Continue excellent diet and  trying to maintain weight of 148 pounds  Smoking: Non-smoker  Alcohol: None alcohol  Screening: Mammogram is scheduled for August his GYN appointment this day colonoscopy was in April 2019.    Assessment/Plan:  Problem List Items Addressed This Visit        Cardiovascular and Mediastinum    Mixed hyperlipidemia    Current Assessment & Plan     Patient has history of mixed hyperlipidemia she is on atorvastatin 40 mg daily denies myalgia arthralgia fasting CMP and lipid are pending no change therapy.  However the patient has lost approximately 40 pounds and may need to reduce her atorvastatin.         Relevant Medications    atorvastatin (LIPITOR) 40 MG tablet    Other Relevant Orders    Lipid Panel    Benign essential hypertension    Current Assessment & Plan     History of essential hypertension with current blood pressure 138/76 repeated 132/76 patient is on Coreg 6.25 twice daily no change therapy.         Relevant Medications    carvedilol (COREG) 6.25 MG tablet    Raynaud's syndrome    Current Assessment & Plan     Patient has mild Raynaud's disease that is symptomatic primarily in the winter with cold weather with some discomfort in fingers and toes encouraged no caffeine and continue all current therapy note patient is not on a calcium channel blocker.            Digestive    Irritable bowel syndrome with diarrhea    Current Assessment & Plan     Patient is chronic irritable bowel with occasional diarrhea currently controlled no change in therapy  Last colonoscopy was 4/8/2019            Genitourinary    Neurogenic bladder    Overview     Patient has neurogenic bladder requiring self catheter catheterization 3-4 times per 24 hours no recent infections stable.         Current Assessment & Plan     Patient has a neurogenic bladder for which she catheterizes herself 3-4 times per day no recent infections no dysuria discomfort.            Other    Family history of diabetes mellitus    Overview     Mother ,aunts  ,uncles         Current Assessment & Plan     Will obtain fasting CMP and A1c.         Relevant Orders    Hemoglobin A1c      Other Visit Diagnoses     Essential hypertension    -  Primary    Relevant Orders    CBC & Differential    Comprehensive Metabolic Panel    TSH    ECG 12 Lead           Patient Instructions   Fasting lab pending  Continue current medical treatment  EKG normal discussed  Continue excellent exercise and dietary control  Renew Valium and acyclovir  Return visit in 6 months or as needed       CMP:  Lab Results   Component Value Date    BUN 16 01/29/2019    CREATININE 0.83 01/29/2019    EGFRIFNONA 72 01/29/2019    BCR 19.3 01/29/2019     01/29/2019    K 4.4 01/29/2019    CO2 28.0 01/29/2019    CALCIUM 9.5 01/29/2019    ALBUMIN 4.32 01/29/2019    BILITOT 0.5 01/29/2019    ALKPHOS 87 01/29/2019    AST 17 01/29/2019    ALT 22 01/29/2019     HbA1c:  No results found for: HGBA1C  Microalbumin:  No results found for: MICROALBUR, POCMALB, POCCREAT  Lipid Panel  Lab Results   Component Value Date    CHOL 182 01/29/2019    TRIG 149 01/29/2019    HDL 48 01/29/2019     01/29/2019    AST 17 01/29/2019    ALT 22 01/29/2019        Counseling was given to patient for the following topics: healthy eating habits.    Plan of care reviewed with patient at the conclusion of today's visit. Education was provided regarding diagnosis, management, and any prescribed or recommended OTC medications.Patient verbalizes understanding of and agreement with management plan.         Ronnie Mas MD    Note: Part of this note may be an electronic transcription/translation of spoken language to printed text using Dragon Dictation System.

## 2019-07-30 NOTE — ASSESSMENT & PLAN NOTE
Patient has a neurogenic bladder for which she catheterizes herself 3-4 times per day no recent infections no dysuria discomfort.

## 2019-07-30 NOTE — ASSESSMENT & PLAN NOTE
Patient has mild Raynaud's disease that is symptomatic primarily in the winter with cold weather with some discomfort in fingers and toes encouraged no caffeine and continue all current therapy note patient is not on a calcium channel blocker.

## 2019-07-30 NOTE — PATIENT INSTRUCTIONS
Fasting lab pending  Continue current medical treatment  EKG normal discussed  Continue excellent exercise and dietary control  Renew Valium and acyclovir  Return visit in 6 months or as needed

## 2019-10-28 ENCOUNTER — TRANSCRIBE ORDERS (OUTPATIENT)
Dept: ADMINISTRATIVE | Facility: HOSPITAL | Age: 54
End: 2019-10-28

## 2019-10-28 DIAGNOSIS — Z12.31 VISIT FOR SCREENING MAMMOGRAM: Primary | ICD-10-CM

## 2020-01-02 ENCOUNTER — PATIENT MESSAGE (OUTPATIENT)
Dept: INTERNAL MEDICINE | Facility: CLINIC | Age: 55
End: 2020-01-02

## 2020-01-02 RX ORDER — OMEPRAZOLE 20 MG/1
20 CAPSULE, DELAYED RELEASE ORAL DAILY
Qty: 90 CAPSULE | Refills: 3 | Status: SHIPPED | OUTPATIENT
Start: 2020-01-02 | End: 2020-01-28

## 2020-01-03 ENCOUNTER — HOSPITAL ENCOUNTER (OUTPATIENT)
Dept: MAMMOGRAPHY | Facility: HOSPITAL | Age: 55
Discharge: HOME | End: 2020-01-03
Admitting: OBSTETRICS & GYNECOLOGY

## 2020-01-03 DIAGNOSIS — Z12.31 VISIT FOR SCREENING MAMMOGRAM: ICD-10-CM

## 2020-01-03 PROCEDURE — 77067 SCR MAMMO BI INCL CAD: CPT

## 2020-01-03 PROCEDURE — 77063 BREAST TOMOSYNTHESIS BI: CPT

## 2020-01-03 PROCEDURE — 77067 SCR MAMMO BI INCL CAD: CPT | Performed by: RADIOLOGY

## 2020-01-03 PROCEDURE — 77063 BREAST TOMOSYNTHESIS BI: CPT | Performed by: RADIOLOGY

## 2020-01-28 ENCOUNTER — OFFICE VISIT (OUTPATIENT)
Dept: INTERNAL MEDICINE | Facility: CLINIC | Age: 55
End: 2020-01-28

## 2020-01-28 ENCOUNTER — LAB (OUTPATIENT)
Dept: LAB | Facility: HOSPITAL | Age: 55
End: 2020-01-28

## 2020-01-28 VITALS
BODY MASS INDEX: 25.61 KG/M2 | WEIGHT: 150 LBS | HEART RATE: 60 BPM | HEIGHT: 64 IN | DIASTOLIC BLOOD PRESSURE: 84 MMHG | SYSTOLIC BLOOD PRESSURE: 128 MMHG

## 2020-01-28 DIAGNOSIS — K21.9 GASTROESOPHAGEAL REFLUX DISEASE WITHOUT ESOPHAGITIS: ICD-10-CM

## 2020-01-28 DIAGNOSIS — G56.01 CARPAL TUNNEL SYNDROME OF RIGHT WRIST: ICD-10-CM

## 2020-01-28 DIAGNOSIS — I10 BENIGN ESSENTIAL HYPERTENSION: Primary | ICD-10-CM

## 2020-01-28 DIAGNOSIS — I10 BENIGN ESSENTIAL HYPERTENSION: ICD-10-CM

## 2020-01-28 DIAGNOSIS — E78.2 MIXED HYPERLIPIDEMIA: ICD-10-CM

## 2020-01-28 LAB
ALBUMIN SERPL-MCNC: 4 G/DL (ref 3.5–5.2)
ALBUMIN/GLOB SERPL: 1.3 G/DL
ALP SERPL-CCNC: 84 U/L (ref 39–117)
ALT SERPL W P-5'-P-CCNC: 14 U/L (ref 1–33)
ANION GAP SERPL CALCULATED.3IONS-SCNC: 14.1 MMOL/L (ref 5–15)
AST SERPL-CCNC: 13 U/L (ref 1–32)
BILIRUB SERPL-MCNC: 0.3 MG/DL (ref 0.2–1.2)
BUN BLD-MCNC: 14 MG/DL (ref 6–20)
BUN/CREAT SERPL: 17.3 (ref 7–25)
CALCIUM SPEC-SCNC: 9.4 MG/DL (ref 8.6–10.5)
CHLORIDE SERPL-SCNC: 102 MMOL/L (ref 98–107)
CHOLEST SERPL-MCNC: 157 MG/DL (ref 0–200)
CO2 SERPL-SCNC: 24.9 MMOL/L (ref 22–29)
CREAT BLD-MCNC: 0.81 MG/DL (ref 0.57–1)
GFR SERPL CREATININE-BSD FRML MDRD: 74 ML/MIN/1.73
GLOBULIN UR ELPH-MCNC: 3.2 GM/DL
GLUCOSE BLD-MCNC: 79 MG/DL (ref 65–99)
HDLC SERPL-MCNC: 51 MG/DL (ref 40–60)
LDLC SERPL CALC-MCNC: 83 MG/DL (ref 0–100)
LDLC/HDLC SERPL: 1.63 {RATIO}
POTASSIUM BLD-SCNC: 4.6 MMOL/L (ref 3.5–5.2)
PROT SERPL-MCNC: 7.2 G/DL (ref 6–8.5)
SODIUM BLD-SCNC: 141 MMOL/L (ref 136–145)
TRIGL SERPL-MCNC: 115 MG/DL (ref 0–150)
VLDLC SERPL-MCNC: 23 MG/DL (ref 5–40)

## 2020-01-28 PROCEDURE — 80061 LIPID PANEL: CPT

## 2020-01-28 PROCEDURE — 80053 COMPREHEN METABOLIC PANEL: CPT

## 2020-01-28 PROCEDURE — 99214 OFFICE O/P EST MOD 30 MIN: CPT | Performed by: INTERNAL MEDICINE

## 2020-01-28 RX ORDER — FAMOTIDINE 40 MG/1
40 TABLET, FILM COATED ORAL DAILY
Qty: 90 TABLET | Refills: 3 | Status: SHIPPED | OUTPATIENT
Start: 2020-01-28 | End: 2021-01-04

## 2020-01-28 NOTE — PROGRESS NOTES
[facility]     Padmini Cochran  1965   [id number]      Patient Care Team:  Ronnie Mas MD as PCP - General (Internal Medicine)  Casey Padilla MD as Consulting Physician (Gynecology)    Chief Complaint::   Chief Complaint   Patient presents with   • Hypertension     follow up   • Hand Pain     with numbness and tingling.  Thumb, first and second fingers only.  Worse at night.  Pain radiates up the wrist.             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      Patient Active Problem List   Diagnosis   • Urinary retention   • Tethered spinal cord (CMS/HCC)   • Neurogenic bladder   • GERD (gastroesophageal reflux disease)   • Depression   • Postmenopausal HRT (hormone replacement therapy)   • Irritable bowel syndrome with diarrhea   • Fibrocystic breast changes, bilateral   • Colon polyp   • Mixed hyperlipidemia   • Benign essential hypertension   • Generalized anxiety disorder   • Gastroesophageal reflux disease without esophagitis   • Major depressive disorder, recurrent, mild (CMS/HCC)   • Anxiety   • Esophagitis, reflux   • Obesity (BMI 30.0-34.9)   • Encounter for annual health examination   • History of spinal cord compression   • Vitamin D deficiency   • Raynaud's syndrome   • Chest pain, unspecified   • Family history of diabetes mellitus   • Hyperlipidemia   • Vaginal enterocele   • Carpal tunnel syndrome of right wrist        Past Medical History:   Diagnosis Date   • Colon polyp     nodular fold sessile serrated polyp   • Depression    • GERD  (gastroesophageal reflux disease)    • Hyperlipidemia    • Hypertension    • Neurogenic bladder    • Tethered spinal cord (CMS/HCC)    • Urinary retention        Past Surgical History:   Procedure Laterality Date   • COLONOSCOPY  06/2012    Inflammed ICV   • DILATATION AND CURETTAGE     • ENDOMETRIAL ABLATION     • ENDOMETRIAL ABLATION  2005    UTERINE  ABLATION   • ENTEROCELE REPAIR     • LAPAROSCOPIC HYSTERECTOMY  07/28/2014   • LAPAROSCOPIC TUBAL LIGATION     • OOPHORECTOMY Bilateral 07/2014   • PELVIC LAPAROSCOPY     • PERINEOPLASTY     • POSTERIOR REPAIR     • SPINAL CORD UNTETHERING     • SPINE SURGERY  2004    SPINAL CORD SURG   • TUBAL ABDOMINAL LIGATION  04/1999       Family History   Problem Relation Age of Onset   • Cancer Maternal Grandmother         COLON   • Colon cancer Maternal Grandmother    • Coronary artery disease Maternal Grandmother    • Lymphoma Father    • Hypertension Father    • Pancreatic cancer Mother    • Diabetes Mother    • Coronary artery disease Mother         premature; angioplasty 8 09   • Hypertension Mother    • Alzheimer's disease Paternal Grandmother    • Coronary artery disease Maternal Grandfather    • Migraines Sister    • Breast cancer Neg Hx    • Ovarian cancer Neg Hx        Social History     Socioeconomic History   • Marital status:      Spouse name: Not on file   • Number of children: Not on file   • Years of education: Not on file   • Highest education level: Not on file   Tobacco Use   • Smoking status: Never Smoker   • Smokeless tobacco: Never Used   Substance and Sexual Activity   • Alcohol use: No   • Drug use: No   • Sexual activity: Yes     Partners: Male     Birth control/protection: Surgical       Allergies   Allergen Reactions   • Levaquin [Levofloxacin] Confusion   • Hydrochlorothiazide Other (See Comments)     Multiple symptoms; DYAZIDE    • Triamterene Other (See Comments)     Multiple sympmtoms; DYAZIDE        Review of Systems     HEENT: Denies  "headache dizzy lightheaded  NECK: Denies dysphasia or reflux or stiffness  CHEST: Denies cough or wheeze is a non-smoker  CARDIAC: Denies chest pain pressure blood pressure stable on therapy  ABD: Denies nausea vomiting abdominal pain  : Denies dysuria frequency has a neurogenic bladder requiring self cath 3 times per day  NEURO: Complains of right hand paresthesias  PSYCH: Mild stress anxiety stable on therapy  EXTREM: Complains of symptoms of right hand carpal tunnel    Vital Signs  Vitals:    01/28/20 1018   BP: 128/84   BP Location: Right arm   Patient Position: Sitting   Cuff Size: Adult   Pulse: 60   Weight: 68 kg (150 lb)   Height: 162.6 cm (64\")   PainSc:   4     Body mass index is 25.75 kg/m².    Current Outpatient Medications:   •  acyclovir (ZOVIRAX) 400 MG tablet, Take 1 tablet by mouth 3 (Three) Times a Day. Take no more than 5 doses a day., Disp: 15 tablet, Rfl: 2  •  atorvastatin (LIPITOR) 40 MG tablet, TAKE 1 TABLET DAILY        (INCREASED DOSE), Disp: 90 tablet, Rfl: 3  •  buPROPion (WELLBUTRIN) 75 MG tablet, TAKE 2 TABLETS DAILY, Disp: 180 tablet, Rfl: 3  •  calcium carbonate (OS-ANNITA) 600 MG tablet, Take 600 mg by mouth daily., Disp: , Rfl:   •  carvedilol (COREG) 6.25 MG tablet, TAKE 1 TABLET 2 TIMES DAILYWITH FOOD, Disp: 180 tablet, Rfl: 3  •  cholecalciferol (VITAMIN D3) 1000 UNITS tablet, Take 1,000 Units by mouth daily., Disp: , Rfl:   •  diazePAM (VALIUM) 5 MG tablet, Take 1 tablet by mouth At Night As Needed for Anxiety., Disp: 30 tablet, Rfl: 2  •  estradiol (ESTRACE) 1 MG tablet, Take 1.5 tablets by mouth Daily., Disp: 45 tablet, Rfl: 11  •  famotidine (PEPCID) 40 MG tablet, Take 1 tablet by mouth Daily., Disp: 90 tablet, Rfl: 3  •  Omega-3 Fatty Acids (FISH OIL) 1000 MG capsule capsule, Take 1,000 mg by mouth Daily With Breakfast., Disp: , Rfl:     Physical Exam     ACE III MINI         HEENT: No facial asymmetry pharynx is clear  NECK: No masses bruit or thyromegaly  CHEST: Clear " without rales or wheezing  CARDIAC: Regular rhythm without gallop or rub  ABD: Liver and spleen normal good bowel sounds  : Deferred  NEURO: Right wrist consistent with carpal tunnel  PSYCH: Mild anxiety depression stable on therapy  EXTREM: Right wrist carpal tunnel  Skin: Clear     Results Review:    No results found for this or any previous visit (from the past 672 hour(s)).  Procedures    Medication Review: Medications reviewed and noted    Patient wellness counseling  Exercise: Encouraged walking exercise  Diet: Encouraged healthy cardiac diet  Smoking: Non-smoker  Alcohol: None alcohol  Screening: Fasting lab pending    Assessment/Plan:    Problem List Items Addressed This Visit        Cardiovascular and Mediastinum    Mixed hyperlipidemia    Overview     History of mixed hyperlipidemia on atorvastatin 40 mg daily denies myalgia arthralgia         Current Assessment & Plan     Mixed hyperlipidemia treated with atorvastatin 40 mg daily fasting lipid and CMP are pending no change therapy         Relevant Medications    atorvastatin (LIPITOR) 40 MG tablet    Other Relevant Orders    Lipid Panel    Benign essential hypertension - Primary    Overview     History of essential hypertension on carvedilol 6.25 twice daily         Current Assessment & Plan     Hypertension treated with carvedilol 6.25 twice daily with current blood pressure 128/84 left and right sitting no change therapy         Relevant Medications    carvedilol (COREG) 6.25 MG tablet    Other Relevant Orders    Comprehensive Metabolic Panel       Digestive    GERD (gastroesophageal reflux disease)    Overview     History of GERD gastritis previously on omeprazole 20 mg daily which is effective in his therapy however insurance is having difficulty covering the cost of the medication.  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                Patient Instructions   Fasting lab pending  No change medical therapy  Patient has carpal tunnel right wrist discussed therapy she would prefer no consultation at this point we will asked the patient to bilateral glove and wear at night if not improved in a week to change to a wrist splint if not improved in a couple of weeks notify us and we will arrange a consultation with plastic hand surgeon  There in 6 months or as needed  Curettaged exercise walking and healthy cardiac diet         Plan of care reviewed with patient at the conclusion of today's visit. Education was provided regarding diagnosis, management, and any prescribed or recommended OTC medications.Patient verbalizes understanding of and agreement with management plan.         Ronnie Mas MD      Note: Part of this note may be an electronic transcription/translation of spoken language to printed text using the Dragon Dictation system.

## 2020-01-28 NOTE — ASSESSMENT & PLAN NOTE
Mixed hyperlipidemia treated with atorvastatin 40 mg daily fasting lipid and CMP are pending no change therapy

## 2020-01-28 NOTE — ASSESSMENT & PLAN NOTE
Clinical exam is consistent with carpal tunnel right wrist suggest wearing leather glove for a week if not improved change to a wrist splint if not improved will refer to hand plastic, the patient would prefer conservative therapy and a referral unless the splinting is not effective.

## 2020-01-28 NOTE — PATIENT INSTRUCTIONS
Fasting lab pending  No change medical therapy  Patient has carpal tunnel right wrist discussed therapy she would prefer no consultation at this point we will asked the patient to bilateral glove and wear at night if not improved in a week to change to a wrist splint if not improved in a couple of weeks notify us and we will arrange a consultation with plastic hand surgeon  There in 6 months or as needed  Curettaged exercise walking and healthy cardiac diet

## 2020-01-28 NOTE — ASSESSMENT & PLAN NOTE
Hypertension treated with carvedilol 6.25 twice daily with current blood pressure 128/84 left and right sitting no change therapy

## 2020-02-18 RX ORDER — BUPROPION HYDROCHLORIDE 75 MG/1
150 TABLET ORAL DAILY
Qty: 180 TABLET | Refills: 3 | Status: SHIPPED | OUTPATIENT
Start: 2020-02-18 | End: 2021-02-01

## 2020-02-28 RX ORDER — ACYCLOVIR 400 MG/1
TABLET ORAL
Qty: 15 TABLET | Refills: 2 | Status: SHIPPED | OUTPATIENT
Start: 2020-02-28 | End: 2020-12-01

## 2020-04-20 RX ORDER — CARVEDILOL 6.25 MG/1
TABLET ORAL
Qty: 180 TABLET | Refills: 3 | Status: SHIPPED | OUTPATIENT
Start: 2020-04-20 | End: 2021-04-02

## 2020-04-20 RX ORDER — ATORVASTATIN CALCIUM 40 MG/1
TABLET, FILM COATED ORAL
Qty: 90 TABLET | Refills: 3 | Status: SHIPPED | OUTPATIENT
Start: 2020-04-20 | End: 2021-04-02

## 2020-08-05 ENCOUNTER — OFFICE VISIT (OUTPATIENT)
Dept: INTERNAL MEDICINE | Facility: CLINIC | Age: 55
End: 2020-08-05

## 2020-08-05 ENCOUNTER — LAB (OUTPATIENT)
Dept: LAB | Facility: HOSPITAL | Age: 55
End: 2020-08-05

## 2020-08-05 VITALS
HEIGHT: 64 IN | DIASTOLIC BLOOD PRESSURE: 88 MMHG | HEART RATE: 60 BPM | SYSTOLIC BLOOD PRESSURE: 130 MMHG | BODY MASS INDEX: 26.22 KG/M2 | WEIGHT: 153.6 LBS | TEMPERATURE: 97.1 F

## 2020-08-05 DIAGNOSIS — Q06.8 TETHERED SPINAL CORD (HCC): ICD-10-CM

## 2020-08-05 DIAGNOSIS — I10 BENIGN ESSENTIAL HYPERTENSION: ICD-10-CM

## 2020-08-05 DIAGNOSIS — R33.9 URINARY RETENTION: ICD-10-CM

## 2020-08-05 DIAGNOSIS — Z00.00 PREVENTATIVE HEALTH CARE: Primary | ICD-10-CM

## 2020-08-05 DIAGNOSIS — E55.9 VITAMIN D DEFICIENCY: ICD-10-CM

## 2020-08-05 DIAGNOSIS — F33.0 MAJOR DEPRESSIVE DISORDER, RECURRENT, MILD (HCC): ICD-10-CM

## 2020-08-05 DIAGNOSIS — F41.9 ANXIETY: ICD-10-CM

## 2020-08-05 DIAGNOSIS — K21.9 GASTROESOPHAGEAL REFLUX DISEASE WITHOUT ESOPHAGITIS: ICD-10-CM

## 2020-08-05 DIAGNOSIS — E78.2 MIXED HYPERLIPIDEMIA: ICD-10-CM

## 2020-08-05 LAB
ALBUMIN SERPL-MCNC: 4.1 G/DL (ref 3.5–5.2)
ALBUMIN/GLOB SERPL: 1.3 G/DL
ALP SERPL-CCNC: 85 U/L (ref 39–117)
ALT SERPL W P-5'-P-CCNC: 13 U/L (ref 1–33)
ANION GAP SERPL CALCULATED.3IONS-SCNC: 10.3 MMOL/L (ref 5–15)
AST SERPL-CCNC: 12 U/L (ref 1–32)
BASOPHILS # BLD AUTO: 0.07 10*3/MM3 (ref 0–0.2)
BASOPHILS NFR BLD AUTO: 1.1 % (ref 0–1.5)
BILIRUB SERPL-MCNC: 0.3 MG/DL (ref 0–1.2)
BUN SERPL-MCNC: 12 MG/DL (ref 6–20)
BUN/CREAT SERPL: 16 (ref 7–25)
CALCIUM SPEC-SCNC: 10.2 MG/DL (ref 8.6–10.5)
CHLORIDE SERPL-SCNC: 102 MMOL/L (ref 98–107)
CHOLEST SERPL-MCNC: 156 MG/DL (ref 0–200)
CO2 SERPL-SCNC: 26.7 MMOL/L (ref 22–29)
CREAT SERPL-MCNC: 0.75 MG/DL (ref 0.57–1)
DEPRECATED RDW RBC AUTO: 37.2 FL (ref 37–54)
EOSINOPHIL # BLD AUTO: 0.3 10*3/MM3 (ref 0–0.4)
EOSINOPHIL NFR BLD AUTO: 4.5 % (ref 0.3–6.2)
ERYTHROCYTE [DISTWIDTH] IN BLOOD BY AUTOMATED COUNT: 11.6 % (ref 12.3–15.4)
GFR SERPL CREATININE-BSD FRML MDRD: 81 ML/MIN/1.73
GLOBULIN UR ELPH-MCNC: 3.2 GM/DL
GLUCOSE SERPL-MCNC: 82 MG/DL (ref 65–99)
HCT VFR BLD AUTO: 39.5 % (ref 34–46.6)
HDLC SERPL-MCNC: 49 MG/DL (ref 40–60)
HGB BLD-MCNC: 13.4 G/DL (ref 12–15.9)
IMM GRANULOCYTES # BLD AUTO: 0.02 10*3/MM3 (ref 0–0.05)
IMM GRANULOCYTES NFR BLD AUTO: 0.3 % (ref 0–0.5)
LDLC SERPL CALC-MCNC: 71 MG/DL (ref 0–100)
LDLC/HDLC SERPL: 1.44 {RATIO}
LYMPHOCYTES # BLD AUTO: 1.6 10*3/MM3 (ref 0.7–3.1)
LYMPHOCYTES NFR BLD AUTO: 24.1 % (ref 19.6–45.3)
MCH RBC QN AUTO: 29.8 PG (ref 26.6–33)
MCHC RBC AUTO-ENTMCNC: 33.9 G/DL (ref 31.5–35.7)
MCV RBC AUTO: 87.8 FL (ref 79–97)
MONOCYTES # BLD AUTO: 0.47 10*3/MM3 (ref 0.1–0.9)
MONOCYTES NFR BLD AUTO: 7.1 % (ref 5–12)
NEUTROPHILS NFR BLD AUTO: 4.18 10*3/MM3 (ref 1.7–7)
NEUTROPHILS NFR BLD AUTO: 62.9 % (ref 42.7–76)
NRBC BLD AUTO-RTO: 0 /100 WBC (ref 0–0.2)
PLATELET # BLD AUTO: 342 10*3/MM3 (ref 140–450)
PMV BLD AUTO: 9.6 FL (ref 6–12)
POTASSIUM SERPL-SCNC: 4.5 MMOL/L (ref 3.5–5.2)
PROT SERPL-MCNC: 7.3 G/DL (ref 6–8.5)
RBC # BLD AUTO: 4.5 10*6/MM3 (ref 3.77–5.28)
SODIUM SERPL-SCNC: 139 MMOL/L (ref 136–145)
TRIGL SERPL-MCNC: 181 MG/DL (ref 0–150)
TSH SERPL DL<=0.05 MIU/L-ACNC: 1.36 UIU/ML (ref 0.27–4.2)
VLDLC SERPL-MCNC: 36.2 MG/DL (ref 5–40)
WBC # BLD AUTO: 6.64 10*3/MM3 (ref 3.4–10.8)

## 2020-08-05 PROCEDURE — 84443 ASSAY THYROID STIM HORMONE: CPT

## 2020-08-05 PROCEDURE — 85025 COMPLETE CBC W/AUTO DIFF WBC: CPT

## 2020-08-05 PROCEDURE — 99396 PREV VISIT EST AGE 40-64: CPT | Performed by: INTERNAL MEDICINE

## 2020-08-05 PROCEDURE — 93000 ELECTROCARDIOGRAM COMPLETE: CPT | Performed by: INTERNAL MEDICINE

## 2020-08-05 PROCEDURE — 80053 COMPREHEN METABOLIC PANEL: CPT

## 2020-08-05 PROCEDURE — 80061 LIPID PANEL: CPT

## 2020-08-05 RX ORDER — DIAZEPAM 5 MG/1
5 TABLET ORAL NIGHTLY PRN
Qty: 30 TABLET | Refills: 2 | Status: SHIPPED | OUTPATIENT
Start: 2020-08-05 | End: 2022-02-23 | Stop reason: SDUPTHER

## 2020-08-05 NOTE — ASSESSMENT & PLAN NOTE
Mixed hyperlipidemia treated with atorvastatin 40 mg daily denies myalgia arthralgia fasting lipid and CMP are pending

## 2020-08-05 NOTE — ASSESSMENT & PLAN NOTE
Continue her hypertension self treated with self cath 3-4 times per day no complications or problems recent urinary tract infection

## 2020-08-05 NOTE — ASSESSMENT & PLAN NOTE
Mild stress depression stable on Valium 5 daily as needed and Wellbutrin 150 mg daily no change therapy

## 2020-08-05 NOTE — PROGRESS NOTES
[facility]     Padmini Cochran  1965   [id number]      Patient Care Team:  Ronnie Mas MD as PCP - General (Internal Medicine)  Casey Padilla MD as Consulting Physician (Gynecology)    Chief Complaint::   Chief Complaint   Patient presents with   • Annual Exam     patient is fasting            HPI  Patient 55-year-old female with a history of tethered spinal cord release 2004 with chronic urinary retention requiring self cath 3-4 times per day history of essential hypertension mild and treated history of mixed hyperlipidemia on atorvastatin 40 and mild stress depression on Valium and Wellbutrin and vitamin D deficiency overall feeling well.  Remote history of GERD and gastritis controlled with famotidine 40 mg patient overall feels well denying headache or dizzy dysphasia cough or wheeze non-smoker nonalcohol drinker denies chest pain or pressure nausea vomiting does self urinary bladder cath 3-4 times per day complains of minor extremity changes without edema      Patient Active Problem List   Diagnosis   • Urinary retention   • Tethered spinal cord (CMS/HCC)   • Neurogenic bladder   • GERD (gastroesophageal reflux disease)   • Depression   • Postmenopausal HRT (hormone replacement therapy)   • Irritable bowel syndrome with diarrhea   • Fibrocystic breast changes, bilateral   • Colon polyp   • Mixed hyperlipidemia   • Benign essential hypertension   • Generalized anxiety disorder   • Gastroesophageal reflux disease without esophagitis   • Major depressive disorder, recurrent, mild (CMS/HCC)   • Anxiety   • Esophagitis, reflux   • Obesity (BMI 30.0-34.9)   • Encounter for annual health examination   • History of spinal cord compression   • Vitamin D deficiency   • Raynaud's syndrome   • Chest pain, unspecified   • Family history of diabetes mellitus   • Hyperlipidemia   • Vaginal enterocele   • Carpal tunnel syndrome of right wrist   • Preventative health care        Past Medical  History:   Diagnosis Date   • Colon polyp     nodular fold sessile serrated polyp   • Depression    • GERD (gastroesophageal reflux disease)    • Hyperlipidemia    • Hypertension    • Neurogenic bladder    • Tethered spinal cord (CMS/HCC)    • Urinary retention        Past Surgical History:   Procedure Laterality Date   • COLONOSCOPY  06/2012    Inflammed ICV   • DILATATION AND CURETTAGE     • ENDOMETRIAL ABLATION     • ENDOMETRIAL ABLATION  2005    UTERINE  ABLATION   • ENTEROCELE REPAIR     • LAPAROSCOPIC HYSTERECTOMY  07/28/2014   • LAPAROSCOPIC TUBAL LIGATION     • OOPHORECTOMY Bilateral 07/2014   • PELVIC LAPAROSCOPY     • PERINEOPLASTY     • POSTERIOR REPAIR     • SPINAL CORD UNTETHERING     • SPINE SURGERY  2004    SPINAL CORD SURG   • TUBAL ABDOMINAL LIGATION  04/1999       Family History   Problem Relation Age of Onset   • Cancer Maternal Grandmother         COLON   • Colon cancer Maternal Grandmother    • Coronary artery disease Maternal Grandmother    • Lymphoma Father    • Hypertension Father    • Pancreatic cancer Mother    • Diabetes Mother    • Coronary artery disease Mother         premature; angioplasty 8 09   • Hypertension Mother    • Alzheimer's disease Paternal Grandmother    • Coronary artery disease Maternal Grandfather    • Migraines Sister    • Breast cancer Neg Hx    • Ovarian cancer Neg Hx        Social History     Socioeconomic History   • Marital status:      Spouse name: Not on file   • Number of children: Not on file   • Years of education: Not on file   • Highest education level: Not on file   Tobacco Use   • Smoking status: Never Smoker   • Smokeless tobacco: Never Used   Substance and Sexual Activity   • Alcohol use: No   • Drug use: No   • Sexual activity: Yes     Partners: Male     Birth control/protection: Surgical       Allergies   Allergen Reactions   • Levaquin [Levofloxacin] Confusion   • Hydrochlorothiazide Other (See Comments)     Multiple symptoms; DYAZIDE    •  "Triamterene Other (See Comments)     Multiple sympmtoms; DYAZIDE        Immunization History   Administered Date(s) Administered   • FLUARIX/FLUZONE/AFLURIA/FLULAVAL QUAD 11/03/2019   • Flu Vaccine Intradermal Quad 18-64YR 11/24/2018   • Hepatitis A 02/18/2019, 09/16/2019   • Tdap 12/30/2015        Health Maintenance Due   Topic Date Due   • ZOSTER VACCINE (1 of 2) 03/19/2016   • HEPATITIS C SCREENING  07/17/2017   • INFLUENZA VACCINE  08/01/2020        Review of Systems     HEENT: Denies headache dizzy vision or hearing change  NECK: Denies dysphasia or reflux  CHEST: Non-smoker denies cough wheeze or shortness of breath or recent pulmonary infections  CARDIAC: Denies chest pain or pressure history of mild hypertension well-controlled with carvedilol twice daily  ABD: Denies nausea vomiting  : Has chronic urinary retention with self care 3-4 times per day remote tethered spinal cord surgery 2004  NEURO: Remote tethered spinal cord release 2004  PSYCH: Mild stress depression stable on therapy  EXTREM: Minor arthritic soreness denies edema    Vital Signs  Vitals:    08/05/20 1026   BP: 130/88   BP Location: Left arm   Patient Position: Sitting   Cuff Size: Adult   Pulse: 60   Temp: 97.1 °F (36.2 °C)   Weight: 69.7 kg (153 lb 9.6 oz)   Height: 162.6 cm (64\")   PainSc: 0-No pain     Body mass index is 26.37 kg/m².  Patient's Body mass index is 26.37 kg/m². BMI is within normal parameters. No follow-up required..     Advance Care Planning         Current Outpatient Medications:   •  acyclovir (ZOVIRAX) 400 MG tablet, TAKE 1 TABLET BY MOUTH THREE TIMES A DAY TAKE NO MORE THAN 5 DOSES A DAY, Disp: 15 tablet, Rfl: 2  •  atorvastatin (LIPITOR) 40 MG tablet, TAKE 1 TABLET DAILY        (INCREASED DOSE), Disp: 90 tablet, Rfl: 3  •  buPROPion (WELLBUTRIN) 75 MG tablet, Take 2 tablets by mouth Daily., Disp: 180 tablet, Rfl: 3  •  calcium carbonate (OS-ANNITA) 600 MG tablet, Take 600 mg by mouth daily., Disp: , Rfl:   •  " carvedilol (COREG) 6.25 MG tablet, TAKE 1 TABLET 2 TIMES DAILYWITH FOOD, Disp: 180 tablet, Rfl: 3  •  cholecalciferol (VITAMIN D3) 1000 UNITS tablet, Take 1,000 Units by mouth daily., Disp: , Rfl:   •  diazePAM (VALIUM) 5 MG tablet, Take 1 tablet by mouth At Night As Needed for Anxiety., Disp: 30 tablet, Rfl: 2  •  famotidine (PEPCID) 40 MG tablet, Take 1 tablet by mouth Daily., Disp: 90 tablet, Rfl: 3    Physical Exam   HEENT: Pupils equal reactive no asymmetry pharynx is clear  NECK: No mass bruit thyromegaly vein distention  CHEST: Clear without rales or wheezing  CARDIAC: Regular rhythm without gallop or rub  ABD: Liver spleen normal positive bowel sounds no bruit  : Deferred  NEURO: Intact  PSYCH: Stress depression well-controlled with current therapy  EXTREM: No edema minimal arthritic change  Skin: Clear     Results Review:    Fasting lab pending EKG discussed    ECG 12 Lead  Date/Time: 8/5/2020 2:55 PM  Performed by: Ronnie Mas MD  Authorized by: Ronnie Mas MD   Comparison: not compared with previous ECG   Rhythm: sinus rhythm  Rate: normal  Conduction: conduction normal    Clinical impression: non-specific ECG  Comments: EKG sinus rhythm no ischemia nonspecific ST changes asymptomatic no change        Patient Wellness Counseling:   Plan of care reviewed with patient at the conclusion of today's visit. Education was provided in regards to diagnosis, diet and exercise, cervical cancer screening, self breast exams, breast cancer screening, and the importance of yearly mammograms.   Nutrition, family planning/contraception, physical activity, healthy weight,ways to reduce stress, adequate sleep, injury prevention, misuse of tobacco, alcohol and drugs, sexual behavior and STD's, dental health, mental health, and immunizations.    Management and any prescribed or recommended OTC medications.  Patient verbalizes understanding of and agreement with management plan.      Medication Review: Medications  reviewed and noted    Patient wellness counseling  Exercise: Encouraged walking exercise  Diet: Encouraged healthy cardiac diet  Smoking: Non-smoker  Alcohol: None alcohol  Screening: Fasting lab pending    Assessment/Plan:  Problem List Items Addressed This Visit        Cardiovascular and Mediastinum    Mixed hyperlipidemia    Overview     History of mixed hyperlipidemia on atorvastatin 40 mg daily denies myalgia arthralgia         Current Assessment & Plan       Mixed hyperlipidemia treated with atorvastatin 40 mg daily denies myalgia arthralgia fasting lipid and CMP are pending         Relevant Medications    atorvastatin (LIPITOR) 40 MG tablet    Other Relevant Orders    Lipid Panel    Benign essential hypertension    Overview     History of essential hypertension on carvedilol 6.25 twice daily         Current Assessment & Plan       Current blood pressure 130/88 left and right sitting continue carvedilol 6.25 twice daily CMP pending         Relevant Medications    carvedilol (COREG) 6.25 MG tablet    Other Relevant Orders    CBC & Differential    Comprehensive Metabolic Panel    TSH    ECG 12 Lead       Digestive    GERD (gastroesophageal reflux disease)    Overview     History of GERD gastritis previously on omeprazole 20 mg daily which is effective in his therapy however insurance is having difficulty covering the cost of the medication.  Will change to H2 blocker famotidine 40 mg daily         Current Assessment & Plan     GERD gastritis controlled with famotidine 40 mg daily         Relevant Medications    famotidine (PEPCID) 40 MG tablet    Vitamin D deficiency    Overview     History of vitamin D deficiency currently on vitamin D3 1000 units daily         Current Assessment & Plan     Continue vitamin D 1000 units daily            Nervous and Auditory    Tethered spinal cord (CMS/HCC)    Overview     Symptomatic urinary retention treated with tethered spinal cord release by Dr. Casey Clark 2004 with  current self cath 3-4 times per day for urinary retention         Current Assessment & Plan     Continue her hypertension self treated with self cath 3-4 times per day no complications or problems recent urinary tract infection            Genitourinary    Urinary retention    Overview     Urinary retention since 2003, with tethered spinal cord surgery 2004 currently asymptomatic and doing self cath 3-4 times per day         Current Assessment & Plan     Self cath for urinary retention 3-4 times per day no recent infections            Other    Major depressive disorder, recurrent, mild (CMS/HCC)    Overview     Mild depression anxiety on Valium 5 mg as needed and Wellbutrin 150 mg daily         Current Assessment & Plan       Mild stress depression stable on Valium 5 daily as needed and Wellbutrin 150 mg daily no change therapy         Relevant Medications    buPROPion (WELLBUTRIN) 75 MG tablet    diazePAM (VALIUM) 5 MG tablet    Anxiety    Overview     Mild anxiety treated with Valium 5 mg daily as needed         Current Assessment & Plan     Continue Valium 5 PRN new prescription written         Relevant Medications    diazePAM (VALIUM) 5 MG tablet    Preventative health care - Primary    Overview     55-year-old female presents for preventive visit and physical examination         Current Assessment & Plan     55-year-old female presents for preventative visit and physical examination                Patient Instructions   EKG discussed  Fasting labs pending  Encouraged walking exercise and healthy cardiac diet  Continue self cath  Return visit in 6 months or as needed  Valium renewed       CMP:  Lab Results   Component Value Date    BUN 14 01/28/2020    CREATININE 0.81 01/28/2020    EGFRIFNONA 74 01/28/2020    BCR 17.3 01/28/2020     01/28/2020    K 4.6 01/28/2020    CO2 24.9 01/28/2020    CALCIUM 9.4 01/28/2020    ALBUMIN 4.00 01/28/2020    BILITOT 0.3 01/28/2020    ALKPHOS 84 01/28/2020    AST 13  01/28/2020    ALT 14 01/28/2020     HbA1c:  Lab Results   Component Value Date    HGBA1C 5.70 (H) 07/30/2019     Microalbumin:  No results found for: MICROALBUR, POCMALB, POCCREAT  Lipid Panel  Lab Results   Component Value Date    CHOL 157 01/28/2020    TRIG 115 01/28/2020    HDL 51 01/28/2020    LDL 83 01/28/2020    AST 13 01/28/2020    ALT 14 01/28/2020        Counseling was given to patient for the following topics: disease prevention.    Plan of care reviewed with patient at the conclusion of today's visit. Education was provided regarding diagnosis, management, and any prescribed or recommended OTC medications.Patient verbalizes understanding of and agreement with management plan.         Ronnie Mas MD    Note: Part of this note may be an electronic transcription/translation of spoken language to printed text using Dragon Dictation System.

## 2020-08-05 NOTE — PATIENT INSTRUCTIONS
EKG discussed  Fasting labs pending  Encouraged walking exercise and healthy cardiac diet  Continue self cath  Return visit in 6 months or as needed  Valium renewed

## 2020-08-26 RX ORDER — ESTRADIOL 1 MG/1
TABLET ORAL
Qty: 135 TABLET | Refills: 0 | Status: SHIPPED | OUTPATIENT
Start: 2020-08-26 | End: 2020-09-10 | Stop reason: DRUGHIGH

## 2020-09-03 RX ORDER — METHYLPREDNISOLONE 4 MG/1
TABLET ORAL
Qty: 1 EACH | Refills: 0 | Status: SHIPPED | OUTPATIENT
Start: 2020-09-03 | End: 2020-12-01

## 2020-09-09 ENCOUNTER — TELEPHONE (OUTPATIENT)
Dept: OBSTETRICS AND GYNECOLOGY | Facility: CLINIC | Age: 55
End: 2020-09-09

## 2020-09-09 NOTE — TELEPHONE ENCOUNTER
PT NEEDS REFILLS OF HER HORMONES ESTRADIOL -     INSURANCE IS STATING WILL NOT PAY FOR 1 1/2 PILLS DAILY -  EITHER 1 OR TWO - IT JUST CANT BE A HALF   [facility]

## 2020-09-09 NOTE — TELEPHONE ENCOUNTER
Dr. Padilla,     The patient's insurance will not pay for the patient to take 1.5 tablets of estradiol daily (1.5 mg).  The patient states that she will either have to take 1 mg or 2 mg daily.  Please advise.

## 2020-09-10 RX ORDER — ESTRADIOL 1 MG/1
1 TABLET ORAL DAILY
Qty: 90 TABLET | Refills: 0 | Status: SHIPPED | OUTPATIENT
Start: 2020-09-10 | End: 2020-12-01 | Stop reason: SDUPTHER

## 2020-09-10 NOTE — TELEPHONE ENCOUNTER
I called Padmini to discuss estrogen dose.  She is currently asymptomatic on estradiol 1.5 mg, and states that she would like to try taking 1 mg daily (insurance will not approve taking 1.5 tablets of 1 mg estradiol daily).  The patient states that she has been thinking about decreasing her dose.    I let Padmini know that I would send a prescription for estradiol 1 mg daily to her pharmacy.  She has an appointment 12/1/2020 with Dr. Padilla.  She has also been advised to call the office if she starts having symptoms, and per Dr. Padilla, her dose may be increased to 2 mg daily.

## 2020-11-14 NOTE — TELEPHONE ENCOUNTER
Appointment 12/1/2020.     DISPLAY PLAN FREE TEXT DISPLAY PLAN FREE TEXT DISPLAY PLAN FREE TEXT DISPLAY PLAN FREE TEXT DISPLAY PLAN FREE TEXT DISPLAY PLAN FREE TEXT

## 2020-12-01 ENCOUNTER — OFFICE VISIT (OUTPATIENT)
Dept: OBSTETRICS AND GYNECOLOGY | Facility: CLINIC | Age: 55
End: 2020-12-01

## 2020-12-01 VITALS
DIASTOLIC BLOOD PRESSURE: 82 MMHG | HEIGHT: 64 IN | BODY MASS INDEX: 27.72 KG/M2 | WEIGHT: 162.4 LBS | SYSTOLIC BLOOD PRESSURE: 128 MMHG

## 2020-12-01 DIAGNOSIS — N31.9 NEUROGENIC BLADDER: ICD-10-CM

## 2020-12-01 DIAGNOSIS — N60.11 FIBROCYSTIC BREAST CHANGES, BILATERAL: ICD-10-CM

## 2020-12-01 DIAGNOSIS — N81.5 VAGINAL ENTEROCELE: ICD-10-CM

## 2020-12-01 DIAGNOSIS — N60.12 FIBROCYSTIC BREAST CHANGES, BILATERAL: ICD-10-CM

## 2020-12-01 DIAGNOSIS — Z79.890 POST-MENOPAUSE ON HRT (HORMONE REPLACEMENT THERAPY): Primary | ICD-10-CM

## 2020-12-01 DIAGNOSIS — Z01.419 ENCOUNTER FOR GYNECOLOGICAL EXAMINATION WITHOUT ABNORMAL FINDING: ICD-10-CM

## 2020-12-01 PROBLEM — Z00.00 ENCOUNTER FOR ANNUAL HEALTH EXAMINATION: Status: RESOLVED | Noted: 2019-07-22 | Resolved: 2020-12-01

## 2020-12-01 PROCEDURE — 99396 PREV VISIT EST AGE 40-64: CPT | Performed by: OBSTETRICS & GYNECOLOGY

## 2020-12-01 RX ORDER — ESTRADIOL 1 MG/1
1 TABLET ORAL DAILY
Qty: 90 TABLET | Refills: 3 | Status: SHIPPED | OUTPATIENT
Start: 2020-12-01 | End: 2021-12-02 | Stop reason: SDUPTHER

## 2020-12-01 NOTE — PROGRESS NOTES
Chief Complaint   Patient presents with   • Annual Exam     assess enterocele   • Med Refill       Padmini Cochran is a 55 y.o. year old  presenting to be seen for her annual exam.  This patient has a prior gynecologic history of a laparoscopic tubal banding; hysteroscopy/D&C/endometrial ablation; and subsequently a robotically-assisted total laparoscopic hysterectomy/bilateral salpingo-oophorectomy/vaginal vault suspension to uterosacral ligaments/enterocele, posterior repair for persistent postmenopausal bleeding and a grade 2-3 enterocele and rectocele.  She has irritable bowel syndrome and strains with bowel movements.  She has had some recurrence of her enterocele.  She has a history of a tethered spine and has a neurogenic bladder.  She does self-catheterization 3-4 times a day.  She currently takes estradiol, 1 mg by mouth daily and has relief of menopausal symptoms.  She has no side effects on estrogen replacement therapy.    SCREENING TESTS    Year 2012   Age                         PAP                         HPV high risk                         Mammogram        benign benign                LUCIO score                         Breast MRI                         Lipids                         Vitamin D                         Colonoscopy                         DEXA  Frax (hip/any)                         Ovarian Screen                             She exercises regularly: yes.  She wears her seat belt: yes.  She has concerns about domestic violence: no.  She has noticed changes in height: no    GYN screening history:  · Last mammogram: was done on approximately 1/3/2020 and the result was: Birads I (Normal)..    No Additional Complaints Reported    The following portions of the patient's history were reviewed and updated as appropriate:vital signs and   She  has a past medical history of Colon  polyp, Depression, GERD (gastroesophageal reflux disease), Hyperlipidemia, Hypertension, Neurogenic bladder, Post-menopause on HRT (hormone replacement therapy), Tethered spinal cord (CMS/HCC), and Urinary retention.  She does not have any pertinent problems on file.  She  has a past surgical history that includes Laparoscopic tubal ligation; Spinal cord untethering; Enterocele repair; Posterior repair; Perineoplasty; Endometrial ablation; Dilation and curettage of uterus; Pelvic laparoscopy; Colonoscopy (06/2012); Tubal ligation (04/1999); Spine surgery (2004); Endometrial ablation (2005); Oophorectomy (Bilateral, 07/2014); and Laparoscopic hysterectomy (07/28/2014).  Her family history includes Alzheimer's disease in her paternal grandmother; Cancer in her maternal grandmother; Colon cancer in her maternal grandmother; Coronary artery disease in her maternal grandfather, maternal grandmother, and mother; Diabetes in her mother; Hypertension in her father and mother; Lymphoma in her father; Migraines in her sister; Pancreatic cancer in her mother.  She  reports that she has never smoked. She has never used smokeless tobacco. She reports that she does not drink alcohol or use drugs.  Current Outpatient Medications   Medication Sig Dispense Refill   • atorvastatin (LIPITOR) 40 MG tablet TAKE 1 TABLET DAILY        (INCREASED DOSE) 90 tablet 3   • buPROPion (WELLBUTRIN) 75 MG tablet Take 2 tablets by mouth Daily. 180 tablet 3   • calcium carbonate (OS-ANNITA) 600 MG tablet Take 600 mg by mouth daily.     • carvedilol (COREG) 6.25 MG tablet TAKE 1 TABLET 2 TIMES DAILYWITH FOOD 180 tablet 3   • cholecalciferol (VITAMIN D3) 1000 UNITS tablet Take 1,000 Units by mouth daily.     • diazePAM (VALIUM) 5 MG tablet Take 1 tablet by mouth At Night As Needed for Anxiety. 30 tablet 2   • estradiol (Estrace) 1 MG tablet Take 1 tablet by mouth Daily. 90 tablet 3   • famotidine (PEPCID) 40 MG tablet Take 1 tablet by mouth Daily. 90  "tablet 3     No current facility-administered medications for this visit.      She is allergic to levaquin [levofloxacin]; hydrochlorothiazide; and triamterene..    Review of Systems  A review of systems was taken.  She denies cough, fever, shortness of breath, and loss of her sense of taste or smell  Constitutional: negative for fever, chills, activity change, appetite change, fatigue and unexpected weight change.  Respiratory: negative  Cardiovascular: negative  Gastrointestinal: positive for constipation  Genitourinary:negative  Musculoskeletal:negative  Behavioral/Psych: negative       /82   Ht 162.6 cm (64\")   Wt 73.7 kg (162 lb 6.4 oz)   Breastfeeding No   BMI 27.88 kg/m²     MEDICALLY INDICATED   Physical Exam    General:  alert; cooperative; well developed; well nourished   Skin:  No suspicious lesions seen   Thyroid: normal to inspection and palpation   Lungs:  breathing is unlabored  clear to auscultation bilaterally   Heart:  regular rate and rhythm, S1, S2 normal, no murmur, click, rub or gallop  normal apical impulse   Breasts:  Examined in supine position  Symmetric without masses or skin dimpling  Nipples normal without inversion, lesions or discharge  There are no palpable axillary nodes  Fibrocystic changes are present both breasts without a discrete mass   Abdomen: soft, non-tender; no masses  no umbilical or inguinal hernias are present  no hepato-splenomegaly   Pelvis: Clinical staff was present for exam  External genitalia:  normal appearance of the external genitalia including Bartholin's and Hines's glands.  Vaginal:  normal pink mucosa without prolapse or lesions.  Cervix:  absent.  Uterus:  absent.  Adnexa:  absent, bilateral.  Rectal:  anus visually normal appearing. recto-vaginal exam unremarkable and confirms findings;  Rectocele GRADE 1  Enterocele GRADE 1     Lab Review   CBC results, CMP results and TSH results    Imaging  Mammogram results              ASSESSMENT  Problems " Addressed this Visit        Digestive    Vaginal enterocele       Genitourinary    Neurogenic bladder    Post-menopause on HRT (hormone replacement therapy) - Primary    Relevant Medications    estradiol (Estrace) 1 MG tablet       Other    Fibrocystic breast changes, bilateral      Other Visit Diagnoses     Encounter for gynecological examination without abnormal finding          Diagnoses       Codes Comments    Post-menopause on HRT (hormone replacement therapy)    -  Primary ICD-10-CM: Z79.890  ICD-9-CM: V07.4     Vaginal enterocele     ICD-10-CM: N81.5  ICD-9-CM: 618.6     Fibrocystic breast changes, bilateral     ICD-10-CM: N60.11, N60.12  ICD-9-CM: 610.1     Neurogenic bladder     ICD-10-CM: N31.9  ICD-9-CM: 596.54     Encounter for gynecological examination without abnormal finding     ICD-10-CM: Z01.419  ICD-9-CM: V72.31               Substance History:   reports that she has never smoked. She has never used smokeless tobacco.   reports no history of alcohol use.   reports no history of drug use.    Substance use counseling is not indicated based on patient history.      PLAN    Medications prescribed this encounter:    New Medications Ordered This Visit   Medications   • estradiol (Estrace) 1 MG tablet     Sig: Take 1 tablet by mouth Daily.     Dispense:  90 tablet     Refill:  3   · I have counseled the patient that her grade 1 enterocele/rectocele is no worse.  I have advised her to attempt to avoid straining with bowel movements and to manage her constipation.  I do not believe that surgical intervention is warranted at this time.  · Monthly self breast assessment and annual breast imaging  · Calcium, 600 mg/ Vit. D, 400 IU daily; regular weight-bearing exercise  · Follow up: 12 month(s)  *Please note that portions of this documentation may have been completed with a voice recognition program.  Efforts were made to edit this dictation, but occasional words may have been mistranscribed.       This note  was electronically signed.    PACO Padilla MD  December 1, 2020  14:23 EST

## 2021-01-04 RX ORDER — FAMOTIDINE 40 MG/1
TABLET, FILM COATED ORAL
Qty: 90 TABLET | Refills: 3 | Status: SHIPPED | OUTPATIENT
Start: 2021-01-04 | End: 2021-12-13

## 2021-02-01 RX ORDER — BUPROPION HYDROCHLORIDE 75 MG/1
TABLET ORAL
Qty: 180 TABLET | Refills: 3 | Status: SHIPPED | OUTPATIENT
Start: 2021-02-01 | End: 2022-01-18

## 2021-02-04 ENCOUNTER — LAB (OUTPATIENT)
Dept: LAB | Facility: HOSPITAL | Age: 56
End: 2021-02-04

## 2021-02-04 ENCOUNTER — OFFICE VISIT (OUTPATIENT)
Dept: INTERNAL MEDICINE | Facility: CLINIC | Age: 56
End: 2021-02-04

## 2021-02-04 VITALS
HEIGHT: 64 IN | SYSTOLIC BLOOD PRESSURE: 124 MMHG | DIASTOLIC BLOOD PRESSURE: 80 MMHG | TEMPERATURE: 97.3 F | BODY MASS INDEX: 26.98 KG/M2 | HEART RATE: 68 BPM | WEIGHT: 158 LBS

## 2021-02-04 DIAGNOSIS — E78.2 MIXED HYPERLIPIDEMIA: Primary | ICD-10-CM

## 2021-02-04 DIAGNOSIS — I10 BENIGN ESSENTIAL HYPERTENSION: ICD-10-CM

## 2021-02-04 DIAGNOSIS — F33.0 MAJOR DEPRESSIVE DISORDER, RECURRENT, MILD (HCC): ICD-10-CM

## 2021-02-04 DIAGNOSIS — E78.2 MIXED HYPERLIPIDEMIA: ICD-10-CM

## 2021-02-04 DIAGNOSIS — K21.9 GASTROESOPHAGEAL REFLUX DISEASE WITHOUT ESOPHAGITIS: ICD-10-CM

## 2021-02-04 LAB
ALBUMIN SERPL-MCNC: 4.1 G/DL (ref 3.5–5.2)
ALBUMIN/GLOB SERPL: 1.3 G/DL
ALP SERPL-CCNC: 93 U/L (ref 39–117)
ALT SERPL W P-5'-P-CCNC: 16 U/L (ref 1–33)
ANION GAP SERPL CALCULATED.3IONS-SCNC: 8.2 MMOL/L (ref 5–15)
AST SERPL-CCNC: 13 U/L (ref 1–32)
BILIRUB SERPL-MCNC: 0.2 MG/DL (ref 0–1.2)
BUN SERPL-MCNC: 14 MG/DL (ref 6–20)
BUN/CREAT SERPL: 15.6 (ref 7–25)
CALCIUM SPEC-SCNC: 9.8 MG/DL (ref 8.6–10.5)
CHLORIDE SERPL-SCNC: 104 MMOL/L (ref 98–107)
CHOLEST SERPL-MCNC: 147 MG/DL (ref 0–200)
CO2 SERPL-SCNC: 25.8 MMOL/L (ref 22–29)
CREAT SERPL-MCNC: 0.9 MG/DL (ref 0.57–1)
GFR SERPL CREATININE-BSD FRML MDRD: 65 ML/MIN/1.73
GLOBULIN UR ELPH-MCNC: 3.1 GM/DL
GLUCOSE SERPL-MCNC: 83 MG/DL (ref 65–99)
HDLC SERPL-MCNC: 46 MG/DL (ref 40–60)
LDLC SERPL CALC-MCNC: 73 MG/DL (ref 0–100)
LDLC/HDLC SERPL: 1.47 {RATIO}
POTASSIUM SERPL-SCNC: 4.7 MMOL/L (ref 3.5–5.2)
PROT SERPL-MCNC: 7.2 G/DL (ref 6–8.5)
SODIUM SERPL-SCNC: 138 MMOL/L (ref 136–145)
TRIGL SERPL-MCNC: 166 MG/DL (ref 0–150)
VLDLC SERPL-MCNC: 28 MG/DL (ref 5–40)

## 2021-02-04 PROCEDURE — 80053 COMPREHEN METABOLIC PANEL: CPT

## 2021-02-04 PROCEDURE — 80061 LIPID PANEL: CPT

## 2021-02-04 PROCEDURE — 99213 OFFICE O/P EST LOW 20 MIN: CPT | Performed by: INTERNAL MEDICINE

## 2021-02-04 NOTE — PATIENT INSTRUCTIONS
Fasting CMP and lipid are pending  Continue current therapy  Encouraged walking exercise  Encouraged healthy cardiac diet with reduce carbs smaller portions and weight loss  Return visit for annual exam and preventative visit in 6 months

## 2021-02-04 NOTE — ASSESSMENT & PLAN NOTE
Current blood pressure 124/80 left and right arm on carvedilol 6.25 twice daily continue therapy CMP is pending

## 2021-02-04 NOTE — ASSESSMENT & PLAN NOTE
Mixed hyperlipidemia on atorvastatin 40 mg daily denies myalgia arthralgia continue therapy fasting CMP and lipid are pending

## 2021-02-04 NOTE — ASSESSMENT & PLAN NOTE
Stress depression well controlled on Wellbutrin 75 mg 2 tablets daily and Valium 5 as needed infrequently 1-2 times per week emotions are stable we will continue Wellbutrin and Valium

## 2021-02-04 NOTE — PROGRESS NOTES
[facility]     Padmini Cochran  1965   [id number]      Patient Care Team:  Ronnie Mas MD as PCP - General (Internal Medicine)  Casey Padilla MD as Consulting Physician (Gynecology)    Chief Complaint::   Chief Complaint   Patient presents with   • Hypertension     follow up   • Hyperlipidemia     follow up            HPI  Patient 55-year-old female with a history of mixed hyperlipidemia on Lipitor 40 mg, history of essential hypertension on carvedilol 6.25 twice daily history of GERD gastritis on famotidine 40 mg overall feeling well no recent changes in diet medication or activity she recently tested negative for COVID-19 denies headache or dizzy cough or wheeze chest pain or pressure nausea vomiting does self cath for neurogenic bladder 3-4 times per day denies extremity discomfort.      Patient Active Problem List   Diagnosis   • Urinary retention   • Tethered spinal cord (CMS/HCC)   • Neurogenic bladder   • GERD (gastroesophageal reflux disease)   • Depression   • Irritable bowel syndrome with diarrhea   • Fibrocystic breast changes, bilateral   • Colon polyp   • Mixed hyperlipidemia   • Benign essential hypertension   • Generalized anxiety disorder   • Gastroesophageal reflux disease without esophagitis   • Major depressive disorder, recurrent, mild (CMS/HCC)   • Anxiety   • Esophagitis, reflux   • Obesity (BMI 30.0-34.9)   • History of spinal cord compression   • Vitamin D deficiency   • Raynaud's syndrome   • Chest pain, unspecified   • Family history of diabetes mellitus   • Hyperlipidemia   • Vaginal enterocele   • Carpal tunnel syndrome of right wrist   • Preventative health care   • Post-menopause on HRT (hormone replacement therapy)        Past Medical History:   Diagnosis Date   • Colon polyp     nodular fold sessile serrated polyp   • Depression    • GERD (gastroesophageal reflux disease)    • Hyperlipidemia    • Hypertension    • Neurogenic bladder    • Post-menopause  on HRT (hormone replacement therapy)    • Tethered spinal cord (CMS/HCC)    • Urinary retention        Past Surgical History:   Procedure Laterality Date   • COLONOSCOPY  06/2012    Inflammed ICV   • DILATATION AND CURETTAGE     • ENDOMETRIAL ABLATION     • ENDOMETRIAL ABLATION  2005    UTERINE  ABLATION   • ENTEROCELE REPAIR     • LAPAROSCOPIC HYSTERECTOMY  07/28/2014   • LAPAROSCOPIC TUBAL LIGATION     • OOPHORECTOMY Bilateral 07/2014   • PELVIC LAPAROSCOPY     • PERINEOPLASTY     • POSTERIOR REPAIR     • SPINAL CORD UNTETHERING     • SPINE SURGERY  2004    SPINAL CORD SURG   • TUBAL ABDOMINAL LIGATION  04/1999       Family History   Problem Relation Age of Onset   • Cancer Maternal Grandmother         COLON   • Colon cancer Maternal Grandmother    • Coronary artery disease Maternal Grandmother    • Lymphoma Father    • Hypertension Father    • Pancreatic cancer Mother    • Diabetes Mother    • Coronary artery disease Mother         premature; angioplasty 8 09   • Hypertension Mother    • Alzheimer's disease Paternal Grandmother    • Coronary artery disease Maternal Grandfather    • Migraines Sister    • Breast cancer Neg Hx    • Ovarian cancer Neg Hx        Social History     Socioeconomic History   • Marital status:      Spouse name: Not on file   • Number of children: Not on file   • Years of education: Not on file   • Highest education level: Not on file   Tobacco Use   • Smoking status: Never Smoker   • Smokeless tobacco: Never Used   Substance and Sexual Activity   • Alcohol use: No   • Drug use: No   • Sexual activity: Yes     Partners: Male     Birth control/protection: Surgical, Post-menopausal       Allergies   Allergen Reactions   • Levaquin [Levofloxacin] Confusion   • Hydrochlorothiazide Other (See Comments)     Multiple symptoms; DYAZIDE    • Triamterene Other (See Comments)     Multiple sympmtoms; DYAZIDE        Review of Systems   Constitutional: Negative for chills, fatigue and fever.  "  HENT: Negative for congestion, ear pain and sinus pressure.    Respiratory: Negative for cough, chest tightness, shortness of breath and wheezing.    Cardiovascular: Negative for chest pain and palpitations.   Gastrointestinal: Negative for abdominal pain, blood in stool and constipation.   Skin: Negative for color change.   Allergic/Immunologic: Negative for environmental allergies.   Neurological: Negative for dizziness, speech difficulty and headache.   Psychiatric/Behavioral: Negative for decreased concentration. The patient is not nervous/anxious.             Vital Signs  Vitals:    02/04/21 1023   BP: 124/80   BP Location: Left arm   Patient Position: Sitting   Pulse: 68   Temp: 97.3 °F (36.3 °C)   Weight: 71.7 kg (158 lb)   Height: 162.6 cm (64\")   PainSc: 0-No pain     Body mass index is 27.12 kg/m².  Patient's Body mass index is 27.12 kg/m². BMI is within normal parameters. No follow-up required..     Advance Care Planning       Current Outpatient Medications:   •  atorvastatin (LIPITOR) 40 MG tablet, TAKE 1 TABLET DAILY        (INCREASED DOSE), Disp: 90 tablet, Rfl: 3  •  buPROPion (WELLBUTRIN) 75 MG tablet, TAKE 2 TABLETS DAILY, Disp: 180 tablet, Rfl: 3  •  calcium carbonate (OS-ANNITA) 600 MG tablet, Take 600 mg by mouth daily., Disp: , Rfl:   •  carvedilol (COREG) 6.25 MG tablet, TAKE 1 TABLET 2 TIMES DAILYWITH FOOD, Disp: 180 tablet, Rfl: 3  •  cholecalciferol (VITAMIN D3) 1000 UNITS tablet, Take 1,000 Units by mouth daily., Disp: , Rfl:   •  diazePAM (VALIUM) 5 MG tablet, Take 1 tablet by mouth At Night As Needed for Anxiety., Disp: 30 tablet, Rfl: 2  •  estradiol (Estrace) 1 MG tablet, Take 1 tablet by mouth Daily., Disp: 90 tablet, Rfl: 3  •  famotidine (PEPCID) 40 MG tablet, TAKE 1 TABLET DAILY, Disp: 90 tablet, Rfl: 3    Physical Exam     ACE III MINI         HEENT: Pupils equal reactive ENT clear no facial asymmetry pharynx is clear  NECK: No mass bruit thyromegaly neck vein distention  CHEST: " Clear without rales wheezes or rhonchi  CARDIAC: Regular rhythm without gallop rub or click blood pressure heart rate stable  ABD: Liver spleen normal positive bowel sounds no bruit  : Deferred  NEURO: Intact  PSYCH: Mild stress depression well controlled on Wellbutrin and as needed Valium  EXTREM: Minimal arthritic change no edema  Skin: Clear     Results Review:    No results found for this or any previous visit (from the past 672 hour(s)).  Procedures    Medication Review: Medications reviewed and noted    Patient wellness counseling  Exercise: Encouraged walking exercise  Diet: Encouraged healthy cardiac diet  Smoking: Non-smoker  Alcohol: None alcohol  Screening: Fasting CMP and lipid are pending    Assessment/Plan:    Problem List Items Addressed This Visit        Cardiac and Vasculature    Mixed hyperlipidemia - Primary    Overview     History of mixed hyperlipidemia on atorvastatin 40 mg daily denies myalgia arthralgia         Current Assessment & Plan       Mixed hyperlipidemia on atorvastatin 40 mg daily denies myalgia arthralgia continue therapy fasting CMP and lipid are pending         Relevant Medications    atorvastatin (LIPITOR) 40 MG tablet    Other Relevant Orders    Comprehensive Metabolic Panel    Lipid Panel    Benign essential hypertension    Overview     History of essential hypertension on carvedilol 6.25 twice daily         Current Assessment & Plan       Current blood pressure 124/80 left and right arm on carvedilol 6.25 twice daily continue therapy CMP is pending         Relevant Medications    carvedilol (COREG) 6.25 MG tablet       Gastrointestinal Abdominal     GERD (gastroesophageal reflux disease)    Overview     History of GERD gastritis previously on omeprazole 20 mg daily which is effective in his therapy however insurance is having difficulty covering the cost of the medication.  Will change to H2 blocker famotidine 40 mg daily         Current Assessment & Plan     History of  GERD gastritis controlled with famotidine 40 mg daily continue therapy CMP pending         Relevant Medications    famotidine (PEPCID) 40 MG tablet       Mental Health    Major depressive disorder, recurrent, mild (CMS/HCC)    Overview     Mild depression anxiety on Valium 5 mg as needed and Wellbutrin 150 mg daily         Current Assessment & Plan       Stress depression well controlled on Wellbutrin 75 mg 2 tablets daily and Valium 5 as needed infrequently 1-2 times per week emotions are stable we will continue Wellbutrin and Valium         Relevant Medications    diazePAM (VALIUM) 5 MG tablet    buPROPion (WELLBUTRIN) 75 MG tablet           Patient Instructions   Fasting CMP and lipid are pending  Continue current therapy  Encouraged walking exercise  Encouraged healthy cardiac diet with reduce carbs smaller portions and weight loss  Return visit for annual exam and preventative visit in 6 months       Plan of care reviewed with patient at the conclusion of today's visit. Education was provided regarding diagnosis, management, and any prescribed or recommended OTC medications.Patient verbalizes understanding of and agreement with management plan.         Ronnie Mas MD      Note: Part of this note may be an electronic transcription/translation of spoken language to printed text using the Dragon Dictation system.      Answers for HPI/ROS submitted by the patient on 2/3/2021   What is the primary reason for your visit?: Physical

## 2021-02-18 ENCOUNTER — TRANSCRIBE ORDERS (OUTPATIENT)
Dept: ADMINISTRATIVE | Facility: HOSPITAL | Age: 56
End: 2021-02-18

## 2021-02-18 DIAGNOSIS — Z12.31 VISIT FOR SCREENING MAMMOGRAM: Primary | ICD-10-CM

## 2021-04-02 RX ORDER — ATORVASTATIN CALCIUM 40 MG/1
TABLET, FILM COATED ORAL
Qty: 90 TABLET | Refills: 3 | Status: SHIPPED | OUTPATIENT
Start: 2021-04-02 | End: 2022-03-21

## 2021-04-02 RX ORDER — CARVEDILOL 6.25 MG/1
TABLET ORAL
Qty: 180 TABLET | Refills: 3 | Status: SHIPPED | OUTPATIENT
Start: 2021-04-02 | End: 2022-03-21

## 2021-04-15 ENCOUNTER — APPOINTMENT (OUTPATIENT)
Dept: MAMMOGRAPHY | Facility: HOSPITAL | Age: 56
End: 2021-04-15

## 2021-05-10 ENCOUNTER — APPOINTMENT (OUTPATIENT)
Dept: MAMMOGRAPHY | Facility: HOSPITAL | Age: 56
End: 2021-05-10

## 2021-06-15 ENCOUNTER — HOSPITAL ENCOUNTER (OUTPATIENT)
Dept: MAMMOGRAPHY | Facility: HOSPITAL | Age: 56
Discharge: HOME | End: 2021-06-15
Admitting: OBSTETRICS & GYNECOLOGY

## 2021-06-15 DIAGNOSIS — Z12.31 VISIT FOR SCREENING MAMMOGRAM: ICD-10-CM

## 2021-06-15 PROCEDURE — 77063 BREAST TOMOSYNTHESIS BI: CPT | Performed by: RADIOLOGY

## 2021-06-15 PROCEDURE — 77063 BREAST TOMOSYNTHESIS BI: CPT

## 2021-06-15 PROCEDURE — 77067 SCR MAMMO BI INCL CAD: CPT

## 2021-06-15 PROCEDURE — 77067 SCR MAMMO BI INCL CAD: CPT | Performed by: RADIOLOGY

## 2021-08-19 ENCOUNTER — OFFICE VISIT (OUTPATIENT)
Dept: INTERNAL MEDICINE | Facility: CLINIC | Age: 56
End: 2021-08-19

## 2021-08-19 ENCOUNTER — LAB (OUTPATIENT)
Dept: LAB | Facility: HOSPITAL | Age: 56
End: 2021-08-19

## 2021-08-19 VITALS
SYSTOLIC BLOOD PRESSURE: 128 MMHG | HEIGHT: 64 IN | DIASTOLIC BLOOD PRESSURE: 82 MMHG | WEIGHT: 160 LBS | TEMPERATURE: 98 F | BODY MASS INDEX: 27.31 KG/M2 | HEART RATE: 66 BPM

## 2021-08-19 DIAGNOSIS — E78.2 MIXED HYPERLIPIDEMIA: ICD-10-CM

## 2021-08-19 DIAGNOSIS — I10 BENIGN ESSENTIAL HYPERTENSION: ICD-10-CM

## 2021-08-19 DIAGNOSIS — K21.9 GASTROESOPHAGEAL REFLUX DISEASE WITHOUT ESOPHAGITIS: ICD-10-CM

## 2021-08-19 DIAGNOSIS — Q06.8 TETHERED SPINAL CORD (HCC): ICD-10-CM

## 2021-08-19 DIAGNOSIS — F33.0 MAJOR DEPRESSIVE DISORDER, RECURRENT, MILD (HCC): ICD-10-CM

## 2021-08-19 DIAGNOSIS — Z00.00 PREVENTATIVE HEALTH CARE: Primary | ICD-10-CM

## 2021-08-19 DIAGNOSIS — F41.1 GENERALIZED ANXIETY DISORDER: ICD-10-CM

## 2021-08-19 DIAGNOSIS — F41.9 ANXIETY: ICD-10-CM

## 2021-08-19 DIAGNOSIS — Z00.00 PREVENTATIVE HEALTH CARE: ICD-10-CM

## 2021-08-19 DIAGNOSIS — E55.9 VITAMIN D DEFICIENCY: ICD-10-CM

## 2021-08-19 DIAGNOSIS — R00.2 PALPITATIONS: ICD-10-CM

## 2021-08-19 LAB
ALBUMIN SERPL-MCNC: 4.6 G/DL (ref 3.5–5.2)
ALBUMIN/GLOB SERPL: 1.4 G/DL
ALP SERPL-CCNC: 129 U/L (ref 39–117)
ALT SERPL W P-5'-P-CCNC: 13 U/L (ref 1–33)
ANION GAP SERPL CALCULATED.3IONS-SCNC: 8.4 MMOL/L (ref 5–15)
AST SERPL-CCNC: 11 U/L (ref 1–32)
BASOPHILS # BLD AUTO: 0.05 10*3/MM3 (ref 0–0.2)
BASOPHILS NFR BLD AUTO: 0.6 % (ref 0–1.5)
BILIRUB SERPL-MCNC: 0.3 MG/DL (ref 0–1.2)
BUN SERPL-MCNC: 13 MG/DL (ref 6–20)
BUN/CREAT SERPL: 16 (ref 7–25)
CALCIUM SPEC-SCNC: 9.7 MG/DL (ref 8.6–10.5)
CHLORIDE SERPL-SCNC: 101 MMOL/L (ref 98–107)
CHOLEST SERPL-MCNC: 188 MG/DL (ref 0–200)
CO2 SERPL-SCNC: 29.6 MMOL/L (ref 22–29)
CREAT SERPL-MCNC: 0.81 MG/DL (ref 0.57–1)
DEPRECATED RDW RBC AUTO: 37.1 FL (ref 37–54)
EOSINOPHIL # BLD AUTO: 0.25 10*3/MM3 (ref 0–0.4)
EOSINOPHIL NFR BLD AUTO: 3.2 % (ref 0.3–6.2)
ERYTHROCYTE [DISTWIDTH] IN BLOOD BY AUTOMATED COUNT: 11.6 % (ref 12.3–15.4)
GFR SERPL CREATININE-BSD FRML MDRD: 73 ML/MIN/1.73
GLOBULIN UR ELPH-MCNC: 3.3 GM/DL
GLUCOSE SERPL-MCNC: 89 MG/DL (ref 65–99)
HCT VFR BLD AUTO: 41.9 % (ref 34–46.6)
HDLC SERPL-MCNC: 49 MG/DL (ref 40–60)
HGB BLD-MCNC: 14.2 G/DL (ref 12–15.9)
IMM GRANULOCYTES # BLD AUTO: 0.02 10*3/MM3 (ref 0–0.05)
IMM GRANULOCYTES NFR BLD AUTO: 0.3 % (ref 0–0.5)
LDLC SERPL CALC-MCNC: 94 MG/DL (ref 0–100)
LDLC/HDLC SERPL: 1.75 {RATIO}
LYMPHOCYTES # BLD AUTO: 1.84 10*3/MM3 (ref 0.7–3.1)
LYMPHOCYTES NFR BLD AUTO: 23.4 % (ref 19.6–45.3)
MCH RBC QN AUTO: 29.6 PG (ref 26.6–33)
MCHC RBC AUTO-ENTMCNC: 33.9 G/DL (ref 31.5–35.7)
MCV RBC AUTO: 87.3 FL (ref 79–97)
MONOCYTES # BLD AUTO: 0.65 10*3/MM3 (ref 0.1–0.9)
MONOCYTES NFR BLD AUTO: 8.3 % (ref 5–12)
NEUTROPHILS NFR BLD AUTO: 5.04 10*3/MM3 (ref 1.7–7)
NEUTROPHILS NFR BLD AUTO: 64.2 % (ref 42.7–76)
NRBC BLD AUTO-RTO: 0 /100 WBC (ref 0–0.2)
PLATELET # BLD AUTO: 332 10*3/MM3 (ref 140–450)
PMV BLD AUTO: 9.3 FL (ref 6–12)
POTASSIUM SERPL-SCNC: 4.5 MMOL/L (ref 3.5–5.2)
PROT SERPL-MCNC: 7.9 G/DL (ref 6–8.5)
RBC # BLD AUTO: 4.8 10*6/MM3 (ref 3.77–5.28)
SODIUM SERPL-SCNC: 139 MMOL/L (ref 136–145)
TRIGL SERPL-MCNC: 266 MG/DL (ref 0–150)
TSH SERPL DL<=0.05 MIU/L-ACNC: 3.45 UIU/ML (ref 0.27–4.2)
VLDLC SERPL-MCNC: 45 MG/DL (ref 5–40)
WBC # BLD AUTO: 7.85 10*3/MM3 (ref 3.4–10.8)

## 2021-08-19 PROCEDURE — 80053 COMPREHEN METABOLIC PANEL: CPT

## 2021-08-19 PROCEDURE — 80061 LIPID PANEL: CPT

## 2021-08-19 PROCEDURE — 93000 ELECTROCARDIOGRAM COMPLETE: CPT | Performed by: INTERNAL MEDICINE

## 2021-08-19 PROCEDURE — 84443 ASSAY THYROID STIM HORMONE: CPT

## 2021-08-19 PROCEDURE — 36415 COLL VENOUS BLD VENIPUNCTURE: CPT

## 2021-08-19 PROCEDURE — 86787 VARICELLA-ZOSTER ANTIBODY: CPT

## 2021-08-19 PROCEDURE — 85025 COMPLETE CBC W/AUTO DIFF WBC: CPT

## 2021-08-19 PROCEDURE — 99396 PREV VISIT EST AGE 40-64: CPT | Performed by: INTERNAL MEDICINE

## 2021-08-19 PROCEDURE — 99213 OFFICE O/P EST LOW 20 MIN: CPT | Performed by: INTERNAL MEDICINE

## 2021-08-19 NOTE — ASSESSMENT & PLAN NOTE
Patient has chronic urinary retention, from tethered spinal cord release by neurosurgery in 2004 and currently does self cath 3-4 times per day for retention no recent urinary tract infections and no complications continue therapy

## 2021-08-19 NOTE — ASSESSMENT & PLAN NOTE
Mixed hyperlipidemia on atorvastatin 40 mg daily denies myalgia arthralgia fasting CMP and lipid pending continue therapy

## 2021-08-19 NOTE — ASSESSMENT & PLAN NOTE
Mild stress depression on Wellbutrin 75 mg 2 tablets daily and doing well with Valium 5 mg as needed infrequently continue therapy

## 2021-08-19 NOTE — PROGRESS NOTES
[facility]     Padmini Cochran  1965   [id number]      Patient Care Team:  Ronnie Mas MD as PCP - General (Internal Medicine)  Casey Padilla MD as Consulting Physician (Gynecology)    Chief Complaint::   Chief Complaint   Patient presents with   • Annual Exam     patient is fasting   • Palpitations     had them daily for 2 weeks in July, but none recently   • shamir     wants to discuss but questions a lab to confirm she's had the chicenpox            HPI  Patient 56-year-old female presents for annual preventative visit and physical examination she overall feels well except for a 2-week episode of cardiac palpitations occurring the last 2 weeks of July not associated with time of day food intake not associated with medication or excess caffeine the palpitations were not associated with nausea vomiting diaphoresis shortness of breath or chest pain she continued her regular medications and did not increase her carvedilol she did not take her heart rate and did not recognize whether the palpitations were regular or irregular the palpitations have resolved and she has had none for the past 3 weeks.  Past history of mild stress and mild depression on Valium as needed and bupropion 150 mg daily mixed hyperlipidemia on Lipitor 40 mg daily and mild essential hypertension on carvedilol 6.25 twice daily on hormone Os-Pako D and Pepcid and vitamin D.  Denies headache or dizzy denies dysphagia denies cough or wheeze denies chest pain or pressure denies nausea vomiting continues to self cath 3-4 times per day denies extremity pain or discomfort.  The patient has had Covid vaccination she is a non-smoker nonalcohol drinker does not have a living well      Patient Active Problem List   Diagnosis   • Urinary retention   • Tethered spinal cord (CMS/HCC)   • Neurogenic bladder   • GERD (gastroesophageal reflux disease)   • Depression   • Irritable bowel syndrome with diarrhea   • Fibrocystic  breast changes, bilateral   • Colon polyp   • Mixed hyperlipidemia   • Benign essential hypertension   • Gastroesophageal reflux disease without esophagitis   • Major depressive disorder, recurrent, mild (CMS/HCC)   • Anxiety   • Esophagitis, reflux   • Obesity (BMI 30.0-34.9)   • History of spinal cord compression   • Vitamin D deficiency   • Raynaud's syndrome   • Chest pain, unspecified   • Family history of diabetes mellitus   • Hyperlipidemia   • Vaginal enterocele   • Carpal tunnel syndrome of right wrist   • Preventative health care   • Post-menopause on HRT (hormone replacement therapy)   • Preventative health care   • Palpitations        Past Medical History:   Diagnosis Date   • Colon polyp     nodular fold sessile serrated polyp   • Depression    • GERD (gastroesophageal reflux disease)    • Hyperlipidemia    • Hypertension    • Neurogenic bladder    • Post-menopause on HRT (hormone replacement therapy)    • Tethered spinal cord (CMS/HCC)    • Urinary retention        Past Surgical History:   Procedure Laterality Date   • COLONOSCOPY  06/2012    Inflammed ICV   • DILATATION AND CURETTAGE     • ENDOMETRIAL ABLATION     • ENDOMETRIAL ABLATION  2005    UTERINE  ABLATION   • ENTEROCELE REPAIR     • LAPAROSCOPIC HYSTERECTOMY  07/28/2014   • LAPAROSCOPIC TUBAL LIGATION     • OOPHORECTOMY Bilateral 07/2014   • PELVIC LAPAROSCOPY     • PERINEOPLASTY     • POSTERIOR REPAIR     • SPINAL CORD UNTETHERING     • SPINE SURGERY  2004    SPINAL CORD SURG   • TUBAL ABDOMINAL LIGATION  04/1999       Family History   Problem Relation Age of Onset   • Cancer Maternal Grandmother         COLON   • Colon cancer Maternal Grandmother    • Coronary artery disease Maternal Grandmother    • Lymphoma Father    • Hypertension Father    • Pancreatic cancer Mother    • Diabetes Mother    • Coronary artery disease Mother         premature; angioplasty 8 09   • Hypertension Mother    • Alzheimer's disease Paternal Grandmother    •  Coronary artery disease Maternal Grandfather    • Migraines Sister    • Breast cancer Neg Hx    • Ovarian cancer Neg Hx        Social History     Socioeconomic History   • Marital status:      Spouse name: Not on file   • Number of children: Not on file   • Years of education: Not on file   • Highest education level: Not on file   Tobacco Use   • Smoking status: Never Smoker   • Smokeless tobacco: Never Used   Substance and Sexual Activity   • Alcohol use: No   • Drug use: No   • Sexual activity: Yes     Partners: Male     Birth control/protection: Surgical, Post-menopausal       Allergies   Allergen Reactions   • Levaquin [Levofloxacin] Confusion   • Hydrochlorothiazide Other (See Comments)     Multiple symptoms; DYAZIDE    • Triamterene Other (See Comments)     Multiple sympmtoms; DYAZIDE        Immunization History   Administered Date(s) Administered   • COVID-19 (PFIZER) 03/18/2021, 04/13/2021   • Flu Vaccine Intradermal Quad 18-64YR 11/24/2018   • Flulaval/Fluarix/Fluzone Quad 11/03/2019, 10/22/2020   • Hepatitis A 02/18/2019, 09/16/2019   • Tdap 12/30/2015        Health Maintenance Due   Topic Date Due   • ZOSTER VACCINE (1 of 2) Never done   • HEPATITIS C SCREENING  Never done        Review of Systems     HEENT: Denies headache or dizzy vision or hearing change  NECK: Denies pain stiffness swelling  CHEST: Non-smoker denies cough or wheeze no recent lung infections  CARDIAC: Denies chest pain pressure does complain of the palpitations that occurred for approximately 2 weeks randomly the last 2 weeks of July 2021 no recurrence in the last 3 weeks  ABD: Denies nausea vomiting abdominal pain  : Chronic urinary retention from tethered spinal cord with surgical release 2004 with self cath bladder 3-4 times per day with no recent infections or complications  NEURO: No syncope concussion or neuropathy  PSYCH: Mild stress mild chronic depression stable on bupropion and Valium  EXTREM: Denies pain discomfort  "or edema    Vital Signs  Vitals:    08/19/21 1031 08/19/21 1045   BP: 130/98 128/82   BP Location: Left arm    Patient Position: Sitting    Cuff Size: Adult    Pulse: 66    Temp: 98 °F (36.7 °C)    Weight: 72.6 kg (160 lb)    Height: 162.6 cm (64\")    PainSc: 0-No pain      Body mass index is 27.46 kg/m².  Patient's Body mass index is 27.46 kg/m². indicating that she is overweight (BMI 25-29.9). Obesity-related health conditions include the following: hypertension and dyslipidemias. Obesity is unchanged. BMI is is above average; BMI management plan is completed. We discussed low calorie, low carb based diet program, portion control and increasing exercise..     Advance Care Planning          Current Outpatient Medications:   •  atorvastatin (LIPITOR) 40 MG tablet, TAKE 1 TABLET DAILY        (INCREASED DOSE), Disp: 90 tablet, Rfl: 3  •  buPROPion (WELLBUTRIN) 75 MG tablet, TAKE 2 TABLETS DAILY, Disp: 180 tablet, Rfl: 3  •  calcium carbonate (OS-ANNITA) 600 MG tablet, Take 600 mg by mouth daily., Disp: , Rfl:   •  carvedilol (COREG) 6.25 MG tablet, TAKE 1 TABLET 2 TIMES DAILYWITH FOOD, Disp: 180 tablet, Rfl: 3  •  cholecalciferol (VITAMIN D3) 1000 UNITS tablet, Take 1,000 Units by mouth daily., Disp: , Rfl:   •  diazePAM (VALIUM) 5 MG tablet, Take 1 tablet by mouth At Night As Needed for Anxiety., Disp: 30 tablet, Rfl: 2  •  estradiol (Estrace) 1 MG tablet, Take 1 tablet by mouth Daily., Disp: 90 tablet, Rfl: 3  •  famotidine (PEPCID) 40 MG tablet, TAKE 1 TABLET DAILY, Disp: 90 tablet, Rfl: 3    Physical Exam   HEENT: Pupils equal reactive ENT clear no facial asymmetry pharynx is clear  NECK: No mass bruit thyromegaly neck vein distention  CHEST: Clear without rales wheezes rhonchi  CARDIAC: Regular rhythm no gallop rub click or murmur  ABD: Abdomen is flat no masses tenderness liver and spleen are normal  : Deferred  NEURO: Intact  PSYCH: Stable on Valium and Wellbutrin  EXTREM: Normal with no edema  Skin: " Clear     Results Review:    Fasting lab pending EKG discussed    ECG 12 Lead    Date/Time: 8/19/2021 5:59 PM  Performed by: Ronnie Mas MD  Authorized by: Ronnie Mas MD   Comparison: compared with previous ECG from 8/5/2020  Similar to previous ECG  Comparison to previous ECG: Sinus rhythm with no ischemia with minimal ST changes unchanged from August 5, 2020 and asymptomatic  Rhythm: sinus rhythm  Rate: normal  Conduction: conduction normal  QRS axis: normal    Clinical impression: non-specific ECG  Comments: Sinus rhythm with stable minimal ST changes similar to EKG of August 5, 2020 asymptomatic        Patient Wellness Counseling:   Plan of care reviewed with patient at the conclusion of today's visit. Education was provided in regards to diagnosis, diet and exercise, cervical cancer screening, self breast exams, breast cancer screening, and the importance of yearly mammograms.   Nutrition, family planning/contraception, physical activity, healthy weight,ways to reduce stress, adequate sleep, injury prevention, misuse of tobacco, alcohol and drugs, sexual behavior and STD's, dental health, mental health, and immunizations.    Management and any prescribed or recommended OTC medications.  Patient verbalizes understanding of and agreement with management plan.      Medication Review: Medications reviewed and noted    Patient wellness counseling  Exercise: Encouraged walking exercise  Diet: Encouraged healthy cardiac diet  Smoking: Non-smoker  Alcohol: None alcohol  Screening: Fasting lab pending    Assessment/Plan:  Problem List Items Addressed This Visit        Cardiac and Vasculature    Mixed hyperlipidemia    Overview     History of mixed hyperlipidemia on atorvastatin 40 mg daily denies myalgia arthralgia         Current Assessment & Plan       Mixed hyperlipidemia on atorvastatin 40 mg daily denies myalgia arthralgia fasting CMP and lipid pending continue therapy         Relevant Medications     atorvastatin (LIPITOR) 40 MG tablet    Other Relevant Orders    Varicella zoster antibody, IgG    CBC & Differential    Comprehensive Metabolic Panel    Lipid Panel    TSH    Benign essential hypertension    Overview     History of essential hypertension on carvedilol 6.25 twice daily         Current Assessment & Plan       Initial blood pressure 130/98 repeated at 128/82 left and right sitting and right arm supine on carvedilol 6.25 twice daily continue therapy CMP is pending EKG shows sinus rhythm with no ischemia and similar to EKG of August 5, 2020         Relevant Medications    carvedilol (COREG) 6.25 MG tablet    Other Relevant Orders    Varicella zoster antibody, IgG    CBC & Differential    Comprehensive Metabolic Panel    Lipid Panel    TSH    ECG 12 Lead    Palpitations    Overview     Patient has had 2-week episode of intermittent cardiac palpitations the last 2 weeks of July 2021 she denies nausea vomiting shortness of breath or diaphoresis although she wears an apple watch she did not think to take her heart rate but did not believe it was fast just felt the palpitations and did not recognize whether they were regular palpitations or chaotic palpitations is occurring randomly over the 2-week.  She knows of no precipitating events has not changed her diet is does not drink excess caffeine she has had no palpitations in the last 3 weeks.  She has not changed her medication of carvedilol 6.25 mg twice daily or any other medications.         Current Assessment & Plan     Approximately 2 weeks of cardiac palpitations that have resolved, the patient has no change in medication diet or activity not associated with chest pain or pressure the patient's EKG is stable sinus rhythm unchanged from August 5, 2020 and her clinical exam is unchanged with clear auscultation of the lungs and regular sinus rhythm without gallop rub click and stable blood pressure.  At this time will not pursue monitoring, however should  the episodes recur have asked the patient to check her heart rate and let us know results.  Is to continue the carvedilol 6.25 twice daily            Endocrine and Metabolic    Vitamin D deficiency    Overview     History of vitamin D deficiency currently on vitamin D3 1000 units daily         Current Assessment & Plan     Vitamin D deficiency on D3 1000 units daily vitamin D level pending continue therapy         Relevant Orders    Varicella zoster antibody, IgG    CBC & Differential    Comprehensive Metabolic Panel    Lipid Panel    TSH       Gastrointestinal Abdominal     GERD (gastroesophageal reflux disease)    Overview     History of GERD gastritis previously on omeprazole 20 mg daily which is effective in his therapy however insurance is having difficulty covering the cost of the medication.  Will change to H2 blocker famotidine 40 mg daily         Current Assessment & Plan     GERD gastritis improved on famotidine 40 mg daily continue therapy         Relevant Medications    famotidine (PEPCID) 40 MG tablet    Other Relevant Orders    Varicella zoster antibody, IgG    CBC & Differential    Comprehensive Metabolic Panel    Lipid Panel    TSH       Health Encounters    Preventative health care - Primary    Overview     Patient 56-year-old female presents for annual preventative visit and physical exam         Relevant Orders    Varicella zoster antibody, IgG    CBC & Differential    Comprehensive Metabolic Panel    Lipid Panel    TSH       Mental Health    Major depressive disorder, recurrent, mild (CMS/HCC)    Overview     Mild depression anxiety on Valium 5 mg as needed and Wellbutrin 150 mg daily         Current Assessment & Plan       Mild stress depression on Wellbutrin 75 mg 2 tablets daily and doing well with Valium 5 mg as needed infrequently continue therapy         Relevant Medications    diazePAM (VALIUM) 5 MG tablet    buPROPion (WELLBUTRIN) 75 MG tablet    Anxiety    Overview     Mild anxiety  treated with Valium 5 mg daily as needed         Current Assessment & Plan     Mild stress anxiety on Valium 5 mg as needed taking infrequently continue as needed therapy         Relevant Medications    diazePAM (VALIUM) 5 MG tablet       Neuro    Tethered spinal cord (CMS/HCC)    Overview     Symptomatic urinary retention treated with tethered spinal cord release by Dr. Casey Clark 2004 with current self cath 3-4 times per day for urinary retention         Current Assessment & Plan     Patient has chronic urinary retention, from tethered spinal cord release by neurosurgery in 2004 and currently does self cath 3-4 times per day for retention no recent urinary tract infections and no complications continue therapy         Relevant Orders    Varicella zoster antibody, IgG    CBC & Differential    Comprehensive Metabolic Panel    Lipid Panel    TSH      Other Visit Diagnoses     Generalized anxiety disorder               Patient Instructions   Fasting lab pending  EKG discussed with sinus rhythm and minimal ST changes similar to EKG of August 5, 2020  Continue all current medications with no change in carvedilol 6.25 twice daily  Should palpitations recur try to check heart rate and rhythm and to notify us  Encouraged walking exercise and healthy cardiac diet  Return visit in 6 months or as needed       CMP:  Lab Results   Component Value Date    BUN 14 02/04/2021    CREATININE 0.90 02/04/2021    EGFRIFNONA 65 02/04/2021    BCR 15.6 02/04/2021     02/04/2021    K 4.7 02/04/2021    CO2 25.8 02/04/2021    CALCIUM 9.8 02/04/2021    ALBUMIN 4.10 02/04/2021    BILITOT 0.2 02/04/2021    ALKPHOS 93 02/04/2021    AST 13 02/04/2021    ALT 16 02/04/2021     HbA1c:  Lab Results   Component Value Date    HGBA1C 5.70 (H) 07/30/2019     Microalbumin:  No results found for: MICROALBUR, POCMALB, POCCREAT  Lipid Panel  Lab Results   Component Value Date    CHOL 147 02/04/2021    TRIG 166 (H) 02/04/2021    HDL 46 02/04/2021     LDL 73 02/04/2021    AST 13 02/04/2021    ALT 16 02/04/2021        Counseling was given to patient for the following topics: disease prevention.    Plan of care reviewed with patient at the conclusion of today's visit. Education was provided regarding diagnosis, management, and any prescribed or recommended OTC medications.Patient verbalizes understanding of and agreement with management plan.         Ronnie Mas MD    Note: Part of this note may be an electronic transcription/translation of spoken language to printed text using Dragon Dictation System.

## 2021-08-19 NOTE — ASSESSMENT & PLAN NOTE
Approximately 2 weeks of cardiac palpitations that have resolved, the patient has no change in medication diet or activity not associated with chest pain or pressure the patient's EKG is stable sinus rhythm unchanged from August 5, 2020 and her clinical exam is unchanged with clear auscultation of the lungs and regular sinus rhythm without gallop rub click and stable blood pressure.  At this time will not pursue monitoring, however should the episodes recur have asked the patient to check her heart rate and let us know results.  Is to continue the carvedilol 6.25 twice daily

## 2021-08-19 NOTE — ASSESSMENT & PLAN NOTE
Initial blood pressure 130/98 repeated at 128/82 left and right sitting and right arm supine on carvedilol 6.25 twice daily continue therapy CMP is pending EKG shows sinus rhythm with no ischemia and similar to EKG of August 5, 2020

## 2021-08-19 NOTE — PATIENT INSTRUCTIONS
Fasting lab pending  EKG discussed with sinus rhythm and minimal ST changes similar to EKG of August 5, 2020  Continue all current medications with no change in carvedilol 6.25 twice daily  Should palpitations recur try to check heart rate and rhythm and to notify us  Encouraged walking exercise and healthy cardiac diet  Return visit in 6 months or as needed

## 2021-08-20 LAB — VZV IGG SER IA-ACNC: 921 INDEX

## 2021-12-02 ENCOUNTER — OFFICE VISIT (OUTPATIENT)
Dept: OBSTETRICS AND GYNECOLOGY | Facility: CLINIC | Age: 56
End: 2021-12-02

## 2021-12-02 VITALS
WEIGHT: 168.6 LBS | HEIGHT: 64 IN | SYSTOLIC BLOOD PRESSURE: 112 MMHG | BODY MASS INDEX: 28.79 KG/M2 | DIASTOLIC BLOOD PRESSURE: 82 MMHG

## 2021-12-02 DIAGNOSIS — Z79.890 POST-MENOPAUSE ON HRT (HORMONE REPLACEMENT THERAPY): ICD-10-CM

## 2021-12-02 DIAGNOSIS — N81.9 MIXED URINARY INCONTINENCE DUE TO FEMALE GENITAL PROLAPSE: ICD-10-CM

## 2021-12-02 DIAGNOSIS — Z01.411 ENCOUNTER FOR GYNECOLOGICAL EXAMINATION WITH ABNORMAL FINDING: Primary | ICD-10-CM

## 2021-12-02 DIAGNOSIS — N39.46 MIXED URINARY INCONTINENCE DUE TO FEMALE GENITAL PROLAPSE: ICD-10-CM

## 2021-12-02 DIAGNOSIS — N81.6 CYSTOCELE WITH RECTOCELE: ICD-10-CM

## 2021-12-02 DIAGNOSIS — N81.10 CYSTOCELE WITH RECTOCELE: ICD-10-CM

## 2021-12-02 DIAGNOSIS — Z79.890 HORMONE REPLACEMENT THERAPY: ICD-10-CM

## 2021-12-02 PROCEDURE — 99396 PREV VISIT EST AGE 40-64: CPT | Performed by: NURSE PRACTITIONER

## 2021-12-02 RX ORDER — ESTRADIOL 1 MG/1
1 TABLET ORAL DAILY
Qty: 90 TABLET | Refills: 3 | Status: SHIPPED | OUTPATIENT
Start: 2021-12-02 | End: 2022-12-16 | Stop reason: SDUPTHER

## 2021-12-02 NOTE — PROGRESS NOTES
Chief Complaint  Padmini Cochran is a 56 y.o.  female presenting for Annual Exam and Med Refill (estradiol 1 mg (#90, 3 refills))    History of Present Illness  Very pleasant 57yo woman who presents for her annual gyn exam and refill of oral ERT.  She is s/p BTL, endometrial ablation in , then subsequent lap hysterectomy and posterior repair. She has no bothersome side effects from the ERT.  We had lengthy discussion re: ERT (see plan below).  She is an active, nonsmoker, 57yo and wishes to continue the current oral ERT tx.  She had tethered cord, which went undiscovered until adulthood.  Had repair, but still with neurogenic bladder.  (She self caths ~ 4x/ day, depending on fluid intake.  And almost never has a UTI.)  ---- praised for good technique!  She does still have some mixed urinary incontinence.  No fecal incont.  She questions whether a pessary might be helpful for her.  We discussed this option.  Last mammo 6/15/2021 nl.  And colonoscopy 2020 (3 yr follow up)      The following portions of the patient's history were reviewed and updated as appropriate: allergies, current medications, past family history, past medical history, past social history, past surgical history and problem list.    Allergies   Allergen Reactions   • Levaquin [Levofloxacin] Confusion   • Hydrochlorothiazide Other (See Comments)     Multiple symptoms; DYAZIDE    • Triamterene Other (See Comments)     Multiple sympmtoms; DYAZIDE          Current Outpatient Medications:   •  atorvastatin (LIPITOR) 40 MG tablet, TAKE 1 TABLET DAILY        (INCREASED DOSE), Disp: 90 tablet, Rfl: 3  •  buPROPion (WELLBUTRIN) 75 MG tablet, TAKE 2 TABLETS DAILY, Disp: 180 tablet, Rfl: 3  •  calcium carbonate (OS-ANNITA) 600 MG tablet, Take 600 mg by mouth daily., Disp: , Rfl:   •  carvedilol (COREG) 6.25 MG tablet, TAKE 1 TABLET 2 TIMES DAILYWITH FOOD, Disp: 180 tablet, Rfl: 3  •  cholecalciferol (VITAMIN D3) 1000 UNITS tablet, Take 1,000  "Units by mouth daily., Disp: , Rfl:   •  diazePAM (VALIUM) 5 MG tablet, Take 1 tablet by mouth At Night As Needed for Anxiety., Disp: 30 tablet, Rfl: 2  •  estradiol (Estrace) 1 MG tablet, Take 1 tablet by mouth Daily., Disp: 90 tablet, Rfl: 3  •  famotidine (PEPCID) 40 MG tablet, TAKE 1 TABLET DAILY, Disp: 90 tablet, Rfl: 3    Past Medical History:   Diagnosis Date   • Colon polyp     nodular fold sessile serrated polyp   • Depression    • GERD (gastroesophageal reflux disease)    • Hyperlipidemia    • Hypertension    • Neurogenic bladder    • Post-menopause on HRT (hormone replacement therapy)    • Tethered spinal cord (HCC)    • Urinary retention         Past Surgical History:   Procedure Laterality Date   • COLONOSCOPY  06/2012    Inflammed ICV   • DILATATION AND CURETTAGE     • ENDOMETRIAL ABLATION     • ENDOMETRIAL ABLATION  2005    UTERINE  ABLATION   • ENTEROCELE REPAIR     • LAPAROSCOPIC HYSTERECTOMY  07/28/2014   • LAPAROSCOPIC TUBAL LIGATION     • OOPHORECTOMY Bilateral 07/2014   • PELVIC LAPAROSCOPY     • PERINEOPLASTY     • POSTERIOR REPAIR     • SPINAL CORD UNTETHERING     • SPINE SURGERY  2004    SPINAL CORD SURG   • TUBAL ABDOMINAL LIGATION  04/1999       Objective  /82   Ht 162.6 cm (64\")   Wt 76.5 kg (168 lb 9.6 oz)   Breastfeeding No   BMI 28.94 kg/m²     Physical Exam  Exam conducted with a chaperone present.   Constitutional:       Appearance: Normal appearance.   HENT:      Head: Normocephalic.   Neck:      Thyroid: No thyroid mass or thyromegaly.   Cardiovascular:      Rate and Rhythm: Normal rate and regular rhythm.      Heart sounds: Normal heart sounds.   Pulmonary:      Effort: Pulmonary effort is normal.      Breath sounds: Normal breath sounds.   Chest:   Breasts:      Right: Normal. No inverted nipple, mass, nipple discharge, axillary adenopathy or supraclavicular adenopathy.      Left: Normal. No inverted nipple, mass, nipple discharge, axillary adenopathy or supraclavicular " adenopathy.       Abdominal:      Palpations: Abdomen is soft. There is no mass.      Tenderness: There is no abdominal tenderness.      Hernia: There is no hernia in the left inguinal area or right inguinal area.   Genitourinary:     General: Normal vulva.      Labia:         Right: No rash or lesion.         Left: No rash or lesion.       Vagina: Prolapsed vaginal walls present. No erythema.      Uterus: Absent.       Adnexa: Right adnexa normal and left adnexa normal.        Right: No mass or tenderness.          Left: No mass or tenderness.        Comments: 2+ cystocele and rectocele;  She has a deep pubic arch, and may do well if wants pessary in future.  Anus appears wnl.  No rectal exam performed.  Lymphadenopathy:      Upper Body:      Right upper body: No supraclavicular or axillary adenopathy.      Left upper body: No supraclavicular or axillary adenopathy.   Neurological:      Mental Status: She is alert.         Assessment/Plan   Diagnoses and all orders for this visit:    1. Encounter for gynecological examination with abnormal finding (Primary)    2. Hormone replacement therapy    3. Mixed urinary incontinence due to female genital prolapse    4. Cystocele with rectocele    5. Post-menopause on HRT (hormone replacement therapy)  -     estradiol (Estrace) 1 MG tablet; Take 1 tablet by mouth Daily.  Dispense: 90 tablet; Refill: 3    Couns re: SBE & mammo, other preventive health procedures.  We had lengthy discussion of ERT with explanation of benefits & risks (including, but not limited to bld clots/DVTs, heart attacks, strokes, pulmonary embolisms, and possible increased risk for cancers).  She verbalized understanding of the risks.  Wishes to cont with current tx for now.  (She is active, 55yo, nonsmoker.)    Procedures        Return in about 1 year (around 12/2/2022) for Annual physical.    Rosemarie Pennington, APRN  12/02/2021

## 2021-12-13 RX ORDER — FAMOTIDINE 40 MG/1
TABLET, FILM COATED ORAL
Qty: 90 TABLET | Refills: 3 | Status: SHIPPED | OUTPATIENT
Start: 2021-12-13 | End: 2022-12-06

## 2022-01-18 RX ORDER — BUPROPION HYDROCHLORIDE 75 MG/1
TABLET ORAL
Qty: 180 TABLET | Refills: 3 | Status: SHIPPED | OUTPATIENT
Start: 2022-01-18 | End: 2023-01-06

## 2022-01-18 NOTE — TELEPHONE ENCOUNTER
Rx Refill Note  Requested Prescriptions     Pending Prescriptions Disp Refills   • buPROPion (WELLBUTRIN) 75 MG tablet [Pharmacy Med Name: BUPROPION TAB 75MG] 180 tablet 3     Sig: TAKE 2 TABLETS DAILY      Last office visit with prescribing clinician: 8/19/2021      Next office visit with prescribing clinician: 2/21/2022            Cindy Castillo RN  01/18/22, 08:51 EST

## 2022-02-23 ENCOUNTER — OFFICE VISIT (OUTPATIENT)
Dept: INTERNAL MEDICINE | Facility: CLINIC | Age: 57
End: 2022-02-23

## 2022-02-23 VITALS
SYSTOLIC BLOOD PRESSURE: 144 MMHG | HEART RATE: 80 BPM | BODY MASS INDEX: 27.66 KG/M2 | DIASTOLIC BLOOD PRESSURE: 90 MMHG | WEIGHT: 162 LBS | HEIGHT: 64 IN | TEMPERATURE: 98.2 F

## 2022-02-23 DIAGNOSIS — E55.9 VITAMIN D DEFICIENCY: ICD-10-CM

## 2022-02-23 DIAGNOSIS — I10 BENIGN ESSENTIAL HYPERTENSION: Primary | ICD-10-CM

## 2022-02-23 DIAGNOSIS — F33.0 MAJOR DEPRESSIVE DISORDER, RECURRENT, MILD: ICD-10-CM

## 2022-02-23 DIAGNOSIS — E78.2 MIXED HYPERLIPIDEMIA: ICD-10-CM

## 2022-02-23 DIAGNOSIS — K21.9 GASTROESOPHAGEAL REFLUX DISEASE WITHOUT ESOPHAGITIS: ICD-10-CM

## 2022-02-23 DIAGNOSIS — R33.9 URINARY RETENTION: ICD-10-CM

## 2022-02-23 DIAGNOSIS — F41.9 ANXIETY: ICD-10-CM

## 2022-02-23 PROCEDURE — 99214 OFFICE O/P EST MOD 30 MIN: CPT | Performed by: INTERNAL MEDICINE

## 2022-02-23 RX ORDER — DIAZEPAM 5 MG/1
5 TABLET ORAL NIGHTLY PRN
Qty: 30 TABLET | Refills: 2 | Status: SHIPPED | OUTPATIENT
Start: 2022-02-23 | End: 2023-02-27 | Stop reason: SDUPTHER

## 2022-02-23 NOTE — PATIENT INSTRUCTIONS
Refill Valium  Refill coude #14 catheters for self cath 3 times daily #270  No laboratory drawn  Continue all current medications  Encouraged healthy cardiac diet and healthy cardiac walking exercise  Return visit in 6 months for annual preventive and physical

## 2022-02-24 NOTE — ASSESSMENT & PLAN NOTE
Urinary retention since 2003 on self-catheterization 3-4 times per day overall doing well no recent urinary tract infections currently stable catheters are reordered

## 2022-02-24 NOTE — ASSESSMENT & PLAN NOTE
Stress depression doing well and currently on Valium 5 mg as needed at night and bupropion 75 mg tablets total of 150 mg daily continue therapy

## 2022-02-24 NOTE — ASSESSMENT & PLAN NOTE
Essential hypertension with initial blood pressure 144/90 repeated 134/80 continue carvedilol 6.25 mg twice daily

## 2022-03-21 RX ORDER — ATORVASTATIN CALCIUM 40 MG/1
TABLET, FILM COATED ORAL
Qty: 90 TABLET | Refills: 3 | Status: SHIPPED | OUTPATIENT
Start: 2022-03-21 | End: 2023-03-09

## 2022-03-21 RX ORDER — CARVEDILOL 6.25 MG/1
TABLET ORAL
Qty: 180 TABLET | Refills: 3 | Status: SHIPPED | OUTPATIENT
Start: 2022-03-21 | End: 2023-03-09

## 2022-04-06 ENCOUNTER — TELEPHONE (OUTPATIENT)
Dept: INTERNAL MEDICINE | Facility: CLINIC | Age: 57
End: 2022-04-06

## 2022-04-06 NOTE — TELEPHONE ENCOUNTER
HIREN FROM [facility] CALLED TO GET MOST RECENT OFFICE NOTES FAXED TO THEM -[phone] done.

## 2022-04-26 ENCOUNTER — TELEPHONE (OUTPATIENT)
Dept: OBSTETRICS AND GYNECOLOGY | Facility: CLINIC | Age: 57
End: 2022-04-26

## 2022-04-26 NOTE — TELEPHONE ENCOUNTER
Patient with grade 2+ cystocele and rectocele.  She was a patient of Dr. Padilla's and was told that she needs surgical repair.  She is ready to do this now, and Dr. Padilla has retired.  Her daughter is a nurse who works in Dr. Rosenthal's office, and they send all of their patients with this type of problem to Dr. Rosario at  for surgery.  She is calling to ask if she needs a referral?  I have told her that we have never referred to Dr. Rosario, so I don't know if she would need a referral.  She will call their office and will call back if needed.

## 2022-05-16 ENCOUNTER — TRANSCRIBE ORDERS (OUTPATIENT)
Dept: ADMINISTRATIVE | Facility: HOSPITAL | Age: 57
End: 2022-05-16

## 2022-05-16 DIAGNOSIS — Z12.31 VISIT FOR SCREENING MAMMOGRAM: Primary | ICD-10-CM

## 2022-06-27 ENCOUNTER — HOSPITAL ENCOUNTER (OUTPATIENT)
Dept: MAMMOGRAPHY | Facility: HOSPITAL | Age: 57
Discharge: HOME | End: 2022-06-27
Admitting: NURSE PRACTITIONER

## 2022-06-27 DIAGNOSIS — Z12.31 VISIT FOR SCREENING MAMMOGRAM: ICD-10-CM

## 2022-06-27 PROCEDURE — 77063 BREAST TOMOSYNTHESIS BI: CPT

## 2022-06-27 PROCEDURE — 77067 SCR MAMMO BI INCL CAD: CPT | Performed by: RADIOLOGY

## 2022-06-27 PROCEDURE — 77063 BREAST TOMOSYNTHESIS BI: CPT | Performed by: RADIOLOGY

## 2022-06-27 PROCEDURE — 77067 SCR MAMMO BI INCL CAD: CPT

## 2022-07-18 RX ORDER — ACYCLOVIR 400 MG/1
400 TABLET ORAL 3 TIMES DAILY
Qty: 12 TABLET | Refills: 1 | Status: SHIPPED | OUTPATIENT
Start: 2022-07-18 | End: 2022-12-27

## 2022-07-25 ENCOUNTER — TELEPHONE (OUTPATIENT)
Dept: INTERNAL MEDICINE | Facility: CLINIC | Age: 57
End: 2022-07-25

## 2022-07-28 RX ORDER — AZITHROMYCIN 250 MG/1
TABLET, FILM COATED ORAL
Qty: 6 TABLET | Refills: 0 | Status: SHIPPED | OUTPATIENT
Start: 2022-07-28 | End: 2022-08-24

## 2022-08-21 PROBLEM — Z00.00 PREVENTATIVE HEALTH CARE: Status: ACTIVE | Noted: 2022-08-21

## 2022-08-24 ENCOUNTER — LAB (OUTPATIENT)
Dept: LAB | Facility: HOSPITAL | Age: 57
End: 2022-08-24

## 2022-08-24 ENCOUNTER — OFFICE VISIT (OUTPATIENT)
Dept: INTERNAL MEDICINE | Facility: CLINIC | Age: 57
End: 2022-08-24

## 2022-08-24 ENCOUNTER — TELEPHONE (OUTPATIENT)
Dept: INTERNAL MEDICINE | Facility: CLINIC | Age: 57
End: 2022-08-24

## 2022-08-24 VITALS
WEIGHT: 154 LBS | DIASTOLIC BLOOD PRESSURE: 80 MMHG | SYSTOLIC BLOOD PRESSURE: 128 MMHG | BODY MASS INDEX: 27.29 KG/M2 | HEIGHT: 63 IN

## 2022-08-24 DIAGNOSIS — I10 BENIGN ESSENTIAL HYPERTENSION: Primary | ICD-10-CM

## 2022-08-24 DIAGNOSIS — N31.9 NEUROGENIC BLADDER: ICD-10-CM

## 2022-08-24 DIAGNOSIS — E78.2 MIXED HYPERLIPIDEMIA: ICD-10-CM

## 2022-08-24 DIAGNOSIS — Z00.00 PREVENTATIVE HEALTH CARE: Primary | ICD-10-CM

## 2022-08-24 DIAGNOSIS — N81.5 VAGINAL ENTEROCELE: ICD-10-CM

## 2022-08-24 DIAGNOSIS — Q06.8 TETHERED SPINAL CORD: ICD-10-CM

## 2022-08-24 DIAGNOSIS — I10 BENIGN ESSENTIAL HYPERTENSION: ICD-10-CM

## 2022-08-24 DIAGNOSIS — U07.1 COVID-19 VIRUS INFECTION: ICD-10-CM

## 2022-08-24 DIAGNOSIS — K21.9 GASTROESOPHAGEAL REFLUX DISEASE WITHOUT ESOPHAGITIS: ICD-10-CM

## 2022-08-24 LAB
ALBUMIN SERPL-MCNC: 4.4 G/DL (ref 3.5–5.2)
ALBUMIN/GLOB SERPL: 1.6 G/DL
ALP SERPL-CCNC: 92 U/L (ref 39–117)
ALT SERPL W P-5'-P-CCNC: 14 U/L (ref 1–33)
ANION GAP SERPL CALCULATED.3IONS-SCNC: 11 MMOL/L (ref 5–15)
AST SERPL-CCNC: 13 U/L (ref 1–32)
BASOPHILS # BLD AUTO: 0.03 10*3/MM3 (ref 0–0.2)
BASOPHILS NFR BLD AUTO: 0.5 % (ref 0–1.5)
BILIRUB SERPL-MCNC: 0.4 MG/DL (ref 0–1.2)
BUN SERPL-MCNC: 13 MG/DL (ref 6–20)
BUN/CREAT SERPL: 14.9 (ref 7–25)
CALCIUM SPEC-SCNC: 9.8 MG/DL (ref 8.6–10.5)
CHLORIDE SERPL-SCNC: 101 MMOL/L (ref 98–107)
CHOLEST SERPL-MCNC: 173 MG/DL (ref 0–200)
CO2 SERPL-SCNC: 25 MMOL/L (ref 22–29)
CREAT SERPL-MCNC: 0.87 MG/DL (ref 0.57–1)
DEPRECATED RDW RBC AUTO: 37.5 FL (ref 37–54)
EGFRCR SERPLBLD CKD-EPI 2021: 78.3 ML/MIN/1.73
EOSINOPHIL # BLD AUTO: 0.16 10*3/MM3 (ref 0–0.4)
EOSINOPHIL NFR BLD AUTO: 2.5 % (ref 0.3–6.2)
ERYTHROCYTE [DISTWIDTH] IN BLOOD BY AUTOMATED COUNT: 12.1 % (ref 12.3–15.4)
GLOBULIN UR ELPH-MCNC: 2.8 GM/DL
GLUCOSE SERPL-MCNC: 86 MG/DL (ref 65–99)
HCT VFR BLD AUTO: 40.6 % (ref 34–46.6)
HDLC SERPL-MCNC: 52 MG/DL (ref 40–60)
HGB BLD-MCNC: 13.5 G/DL (ref 12–15.9)
IMM GRANULOCYTES # BLD AUTO: 0.02 10*3/MM3 (ref 0–0.05)
IMM GRANULOCYTES NFR BLD AUTO: 0.3 % (ref 0–0.5)
LDLC SERPL CALC-MCNC: 77 MG/DL (ref 0–100)
LDLC/HDLC SERPL: 1.29 {RATIO}
LYMPHOCYTES # BLD AUTO: 1.7 10*3/MM3 (ref 0.7–3.1)
LYMPHOCYTES NFR BLD AUTO: 26.5 % (ref 19.6–45.3)
MCH RBC QN AUTO: 28.7 PG (ref 26.6–33)
MCHC RBC AUTO-ENTMCNC: 33.3 G/DL (ref 31.5–35.7)
MCV RBC AUTO: 86.4 FL (ref 79–97)
MONOCYTES # BLD AUTO: 0.53 10*3/MM3 (ref 0.1–0.9)
MONOCYTES NFR BLD AUTO: 8.3 % (ref 5–12)
NEUTROPHILS NFR BLD AUTO: 3.98 10*3/MM3 (ref 1.7–7)
NEUTROPHILS NFR BLD AUTO: 61.9 % (ref 42.7–76)
NRBC BLD AUTO-RTO: 0 /100 WBC (ref 0–0.2)
PLATELET # BLD AUTO: 294 10*3/MM3 (ref 140–450)
PMV BLD AUTO: 9.3 FL (ref 6–12)
POTASSIUM SERPL-SCNC: 4.4 MMOL/L (ref 3.5–5.2)
PROT SERPL-MCNC: 7.2 G/DL (ref 6–8.5)
RBC # BLD AUTO: 4.7 10*6/MM3 (ref 3.77–5.28)
SODIUM SERPL-SCNC: 137 MMOL/L (ref 136–145)
TRIGL SERPL-MCNC: 270 MG/DL (ref 0–150)
TSH SERPL DL<=0.05 MIU/L-ACNC: 1.45 UIU/ML (ref 0.27–4.2)
VLDLC SERPL-MCNC: 44 MG/DL (ref 5–40)
WBC NRBC COR # BLD: 6.42 10*3/MM3 (ref 3.4–10.8)

## 2022-08-24 PROCEDURE — 93000 ELECTROCARDIOGRAM COMPLETE: CPT | Performed by: INTERNAL MEDICINE

## 2022-08-24 PROCEDURE — 80050 GENERAL HEALTH PANEL: CPT

## 2022-08-24 PROCEDURE — 99396 PREV VISIT EST AGE 40-64: CPT | Performed by: INTERNAL MEDICINE

## 2022-08-24 PROCEDURE — 99213 OFFICE O/P EST LOW 20 MIN: CPT | Performed by: INTERNAL MEDICINE

## 2022-08-24 PROCEDURE — 80061 LIPID PANEL: CPT

## 2022-08-24 NOTE — ASSESSMENT & PLAN NOTE
Essential hypertension with current blood pressure 128/80 left and right sitting on carvedilol 6.25 mg twice daily continue therapy CMP is pending, EKG shows sinus rhythm with nonspecific ST and T wave changes that have been present on previous is EKGs specifically August 19, 2021 continue therapy

## 2022-08-24 NOTE — PATIENT INSTRUCTIONS
Fasting lab pending  EKG with T wave changes no change from previous discussed  Continue all current therapy  Continue excellent weight loss control with weight watchers  Continue exercise  Follow-up with GYN for proposed prolapse vaginal surgery at [facility]-  Return visit in 6 months or as needed

## 2022-08-24 NOTE — ASSESSMENT & PLAN NOTE
Patient had COVID infection July 26, 2022 with only residual is resumption of her previously long-term loss of taste and smell and she is now able to smell and taste previous foods and fragrances that she has not been able to smell in the past multiple years

## 2022-08-24 NOTE — PROGRESS NOTES
"[facility]     Padmini Cochran  1965   [id number]      Patient Care Team:  Ronnie Mas MD as PCP - General (Internal Medicine)  Casey Padilla MD (Inactive) as Consulting Physician (Gynecology)    Chief Complaint::   Chief Complaint   Patient presents with   • Annual Exam     Labs drawn this morning   • Pre-op Exam     Patient considering having vaginal prolapse surgery at .  Wonders if could have pre-op exam if decides to have this done.             HPI  Padmini Cochran is a 57 year old female who presents for an Annual Wellness Visit and physical examination. She has a history of mixed hyperlipidemia, chronic stress, essential hypertension, GERD, neurogenic bladder, and is potentially scheduled for vaginal prolapse surgery at  in the near future.    The patient reports she has lost 8 pounds since her last visit. She that she has not been scheduled for the vaginal prolapse surgery yet. She notes she has been called twice and every time they call her with a date, it is something she can not do. The patient adds she has almost taught herself out of it. She reports she is back on the Weight Watchers program and it is easy for her to follow as long as she does not have any \"cheat\" days. She states she did do meetings until COVID happened and they closed the center, but they never opened it back up. She reports she has also been doing a lot of Kegel exercises.    The patient reports she is having some pressure in her vaginal area. She states she has a bulge in the vaginal area. She reports that it does drive her crazy sometimes. She denies it makes it difficult to catheterize herself. She states adds if she lays down, she can contract it back in. The patient has two children. She reports notes it has been bothering her for the last couple of years.    The patient reports she tested positive for COVID on 07/26/2022. She states she was sick for 1 day before she tested positive. She " reports that her  tested positive on 08/22/2022 and she had the same symptoms. She states she tested negative on 08/22/2022, but she did it again on 08/23/2022 and she was positive. The patient notes it was nothing more than a cold for her , but it was a little worse on her. She states she had a sore throat and some coughing. She reports that she has not been able to smell since she was in her 20s but since she had COVID she has been able to smell for the last month. The patient likes the smell of fresh cut grass. She states it would come and go for a couple of hours and then she would not be able to smell for 2 months.    The patient reports she did take her medications today.    The patient reports her gynecologist retired but she was last seen by Deloris BAILEY. She states her daughter works for [employer] and her daughter recommended that she go to  at . The patient notes she went to him in 05/2022 and she saw the PA and the decision was left up to her. She adds she later called her back and told her that she had decided to have it. The patient reports they have been trying to schedule her since 06/01/2022. She states it would be done laparoscopically using mesh. The patient believes mesh was used when she had her spinal cord surgery in 2004 and she has not had any problems. She states they did not discuss if it would interfere with her bladder catheterization because it is not the bladder which is prolapsed it is like the vaginal walls. She reports some days it is worse and some days it is not.        Patient Active Problem List   Diagnosis   • Urinary retention   • Tethered spinal cord (HCC)   • Neurogenic bladder   • GERD (gastroesophageal reflux disease)   • Depression   • Irritable bowel syndrome with diarrhea   • Fibrocystic breast changes, bilateral   • Colon polyp   • Mixed hyperlipidemia   • Benign essential hypertension   • Gastroesophageal reflux disease without esophagitis   • Major  depressive disorder, recurrent, mild (HCC)   • Anxiety   • Esophagitis, reflux   • Obesity (BMI 30.0-34.9)   • History of spinal cord compression   • Vitamin D deficiency   • Raynaud's syndrome   • Chest pain, unspecified   • Family history of diabetes mellitus   • Hyperlipidemia   • Vaginal enterocele   • Carpal tunnel syndrome of right wrist   • Preventative health care   • Post-menopause on HRT (hormone replacement therapy)   • Preventative health care   • Palpitations   • Preventative health care   • COVID-19 virus infection        Past Medical History:   Diagnosis Date   • Colon polyp     nodular fold sessile serrated polyp   • Depression    • GERD (gastroesophageal reflux disease)    • Hyperlipidemia    • Hypertension    • Neurogenic bladder    • Post-menopause on HRT (hormone replacement therapy)    • Tethered spinal cord (HCC)    • Urinary retention        Past Surgical History:   Procedure Laterality Date   • COLONOSCOPY  06/2012    Inflammed ICV   • DILATATION AND CURETTAGE     • ENDOMETRIAL ABLATION     • ENDOMETRIAL ABLATION  2005    UTERINE  ABLATION   • ENTEROCELE REPAIR     • LAPAROSCOPIC HYSTERECTOMY  07/28/2014   • LAPAROSCOPIC TUBAL LIGATION     • OOPHORECTOMY Bilateral 07/2014   • PELVIC LAPAROSCOPY     • PERINEOPLASTY     • POSTERIOR REPAIR     • SPINAL CORD UNTETHERING     • SPINE SURGERY  2004    SPINAL CORD SURG   • TUBAL ABDOMINAL LIGATION  04/1999       Family History   Problem Relation Age of Onset   • Cancer Maternal Grandmother         COLON   • Colon cancer Maternal Grandmother    • Coronary artery disease Maternal Grandmother    • Lymphoma Father    • Hypertension Father    • Pancreatic cancer Mother    • Diabetes Mother    • Coronary artery disease Mother         premature; angioplasty 8 09   • Hypertension Mother    • Alzheimer's disease Paternal Grandmother    • Coronary artery disease Maternal Grandfather    • Migraines Sister    • Breast cancer Neg Hx    • Ovarian cancer Neg Hx   "      Social History     Socioeconomic History   • Marital status:    Tobacco Use   • Smoking status: Never Smoker   • Smokeless tobacco: Never Used   Substance and Sexual Activity   • Alcohol use: No   • Drug use: No   • Sexual activity: Yes     Partners: Male     Birth control/protection: Surgical, Post-menopausal       Allergies   Allergen Reactions   • Levaquin [Levofloxacin] Confusion   • Hydrochlorothiazide Other (See Comments)     Multiple symptoms; DYAZIDE    • Triamterene Other (See Comments)     Multiple sympmtoms; DYAZIDE        Immunization History   Administered Date(s) Administered   • COVID-19 (PFIZER) PURPLE CAP 03/18/2021, 04/13/2021, 12/03/2021   • Flu Vaccine Intradermal Quad 18-64YR 11/24/2018   • FluLaval/Fluzone >6mos 11/03/2019, 10/22/2020   • Hepatitis A 02/18/2019, 09/16/2019   • Influenza, Unspecified 11/08/2021   • Tdap 12/30/2015        Health Maintenance Due   Topic Date Due   • ZOSTER VACCINE (1 of 2) Never done   • HEPATITIS C SCREENING  Never done   • COVID-19 Vaccine (4 - Booster for Pfizer series) 04/03/2022   • LIPID PANEL  08/19/2022        Review of Systems     HEENT: Denies any hearing changes, eyesight changes, no headache or dizziness  NECK: Denies any pain, stiffness, swelling or dysphagia  CHEST: Denies cough or wheeze or recent lung infections  CARDIAC: Denies chest pain, pressure or palpitations  ABD: Denies nausea, vomiting or abdominal pain  : Denies dysuria does self cath 4 times per day for neurogenic bladder and has vaginal prolapse  NEURO: Denies syncope, concussion, neuropathy  PSYCH: Denies any stress  EXTREM: Denies arthritic changes myalgia or arthralgia  SKIN: Denies any rash    Vital Signs  Vitals:    08/24/22 1331 08/24/22 1401   BP: 136/88 128/80   BP Location: Left arm    Patient Position: Sitting    Cuff Size: Adult    Weight: 69.9 kg (154 lb)    Height: 160.7 cm (63.25\")    PainSc: 0-No pain      Body mass index is 27.06 kg/m².  BMI is >= 25 and " <30. (Overweight) The following options were offered after discussion;: exercise counseling/recommendations and nutrition counseling/recommendations is currently on weight watchers has lost 14 pounds in the last 8 months     Advance Care Planning         Current Outpatient Medications:   •  acyclovir (Zovirax) 400 MG tablet, Take 1 tablet by mouth 3 (Three) Times a Day. Take no more than 5 doses a day., Disp: 12 tablet, Rfl: 1  •  atorvastatin (LIPITOR) 40 MG tablet, TAKE 1 TABLET DAILY        (INCREASED DOSE), Disp: 90 tablet, Rfl: 3  •  buPROPion (WELLBUTRIN) 75 MG tablet, TAKE 2 TABLETS DAILY, Disp: 180 tablet, Rfl: 3  •  calcium carbonate (OS-ANNITA) 600 MG tablet, Take 600 mg by mouth daily., Disp: , Rfl:   •  carvedilol (COREG) 6.25 MG tablet, TAKE 1 TABLET 2 TIMES DAILYWITH FOOD, Disp: 180 tablet, Rfl: 3  •  cholecalciferol (VITAMIN D3) 1000 UNITS tablet, Take 1,000 Units by mouth daily., Disp: , Rfl:   •  diazePAM (VALIUM) 5 MG tablet, Take 1 tablet by mouth At Night As Needed for Anxiety., Disp: 30 tablet, Rfl: 2  •  estradiol (Estrace) 1 MG tablet, Take 1 tablet by mouth Daily., Disp: 90 tablet, Rfl: 3  •  famotidine (PEPCID) 40 MG tablet, TAKE 1 TABLET DAILY, Disp: 90 tablet, Rfl: 3    Physical Exam   HEENT: Pupils equal reactive ENT clear, no facial asymmetry, pharynx is clear  NECK: No masses bruit or thyromegaly  CHEST: Clear without rales wheezes or rhonchi  CARDIAC: Regular rhythm without gallop rub or click, blood pressure heart rate stable  ABD: Liver spleen normal, good bowel sounds, nontender  : Deferred  NEURO: Intact  PSYCH: Normal  EXTREM: No significant arthritic changes, no edema. Good circulation, no fluid. Bilateral patella and patella appear normal.  SKIN: Clear     Results Review:    Fasting lab pending and EKG discussed    ECG 12 Lead    Date/Time: 8/24/2022 6:15 PM  Performed by: Ronnie Mas MD  Authorized by: Ronnie Mas MD   Comparison: compared with previous ECG from  8/19/2021  Similar to previous ECG  Comparison to previous ECG: Current EKG shows sinus rhythm with nonspecific ST and T wave changes similar to EKG of August 19, 2021  Rhythm: sinus rhythm  Rate: normal  Conduction: conduction normal  QRS axis: normal  Other findings: non-specific ST-T wave changes    Clinical impression: non-specific ECG  Comments: Current EKG shows sinus rhythm with nonspecific ST and T wave changes similar to EKG in the past August 19, 2021 asymptomatic        Patient Wellness Counseling:   Plan of care reviewed with patient at the conclusion of today's visit. Education was provided in regards to diagnosis, diet and exercise, cervical cancer screening, self breast exams, breast cancer screening, and the importance of yearly mammograms.   Nutrition, family planning/contraception, physical activity, healthy weight,ways to reduce stress, adequate sleep, injury prevention, misuse of tobacco, alcohol and drugs, sexual behavior and STD's, dental health, mental health, and immunizations.    Management and any prescribed or recommended OTC medications.  Patient verbalizes understanding of and agreement with management plan.    Medication Review: Medications reviewed and noted    Patient wellness counseling  Exercise: Encouraged continued walking exercise  Diet: Encouraged continued weight watcher diet and continued successful weight loss  Screening: Fasting lab pending  Social History     Socioeconomic History   • Marital status:    Tobacco Use   • Smoking status: Never Smoker   • Smokeless tobacco: Never Used   Substance and Sexual Activity   • Alcohol use: No   • Drug use: No   • Sexual activity: Yes     Partners: Male     Birth control/protection: Surgical, Post-menopausal        Assessment/Plan:  Problem List Items Addressed This Visit        Cardiac and Vasculature    Mixed hyperlipidemia    Overview     History of mixed hyperlipidemia on atorvastatin 40 mg daily denies myalgia arthralgia          Current Assessment & Plan       Mixed hyperlipidemia on atorvastatin 40 mg daily denies myalgia arthralgia, the patient has lost weight up 14 pounds since December 2021  Fasting CMP and lipid are pending continue therapy pending lab work         Relevant Medications    atorvastatin (LIPITOR) 40 MG tablet    Benign essential hypertension    Overview     History of essential hypertension on carvedilol 6.25 twice daily         Current Assessment & Plan       Essential hypertension with current blood pressure 128/80 left and right sitting on carvedilol 6.25 mg twice daily continue therapy CMP is pending, EKG shows sinus rhythm with nonspecific ST and T wave changes that have been present on previous is EKGs specifically August 19, 2021 continue therapy         Relevant Medications    carvedilol (COREG) 6.25 MG tablet       Gastrointestinal Abdominal     Gastroesophageal reflux disease without esophagitis    Overview     GERD reflux on famotidine 40 mg daily         Current Assessment & Plan     GERD reflux controlled with famotidine 40 mg once daily continue therapy         Relevant Medications    famotidine (PEPCID) 40 MG tablet       Genitourinary and Reproductive     Neurogenic bladder    Overview     Patient has neurogenic bladder requiring self catheter catheterization 3-4 times per 24 hours no recent infections stable.         Vaginal enterocele    Overview     Patient is vaginal prolapse which she is able to reduce and since her weight loss of 14 pounds in the last 8 months has had less problem it has been recommended that she have prolapse surgery and she is seriously considering this to be done at  in the near future         Current Assessment & Plan     Concur with prolapse surgery recommendation at  in the near future            Health Encounters    Preventative health care - Primary    Overview     Patient 57-year-old female presents for preventative visit and physical examination on August 24,  2022            Neuro    Tethered spinal cord (HCC)    Overview     Symptomatic urinary retention treated with tethered spinal cord release by Dr. Casey Clark 2004 with current self cath 3-4 times per day for urinary retention         Current Assessment & Plan     Tethered spinal cord release 2004 with neurogenic bladder self cathing 3-4 times per day for urinary retention currently stable            Other    COVID-19 virus infection    Overview     July 26 2022         Current Assessment & Plan     Patient had COVID infection July 26, 2022 with only residual is resumption of her previously long-term loss of taste and smell and she is now able to smell and taste previous foods and fragrances that she has not been able to smell in the past multiple years         Relevant Medications    acyclovir (Zovirax) 400 MG tablet           Patient Instructions   Fasting lab pending  EKG with T wave changes no change from previous discussed  Continue all current therapy  Continue excellent weight loss control with weight watchers  Continue exercise  Follow-up with GYN for proposed prolapse vaginal surgery at [facility]-  Return visit in 6 months or as needed       CMP:  Lab Results   Component Value Date    BUN 13 08/19/2021    CREATININE 0.81 08/19/2021    EGFRIFNONA 73 08/19/2021    BCR 16.0 08/19/2021     08/19/2021    K 4.5 08/19/2021    CO2 29.6 (H) 08/19/2021    CALCIUM 9.7 08/19/2021    ALBUMIN 4.60 08/19/2021    BILITOT 0.3 08/19/2021    ALKPHOS 129 (H) 08/19/2021    AST 11 08/19/2021    ALT 13 08/19/2021     HbA1c:  Lab Results   Component Value Date    HGBA1C 5.70 (H) 07/30/2019     Microalbumin:  No results found for: MICROALBUR, POCMALB, POCCREAT  Lipid Panel  Lab Results   Component Value Date    CHOL 188 08/19/2021    TRIG 266 (H) 08/19/2021    HDL 49 08/19/2021    LDL 94 08/19/2021    AST 11 08/19/2021    ALT 13 08/19/2021        Counseling was given to patient for the following topics: disease  prevention.    Plan of care reviewed with patient at the conclusion of today's visit. Education was provided regarding diagnosis, management, and any prescribed or recommended OTC medications.Patient verbalizes understanding of and agreement with management plan.         Ronnie Mas MD    Note: Part of this note may be an electronic transcription/translation of spoken language to printed text using Dragon Dictation System.      Transcribed from ambient dictation for Ronnie Mas MD by Carloz Richardson.  08/24/22   16:04 EDT    Patient verbalized consent to the visit recording.  I have personally performed the services described in this document as transcribed by the above individual, and it is both accurate and complete.  Ronnie Mas MD  8/24/2022  18:15 EDT

## 2022-08-24 NOTE — ASSESSMENT & PLAN NOTE
Mixed hyperlipidemia on atorvastatin 40 mg daily denies myalgia arthralgia, the patient has lost weight up 14 pounds since December 2021  Fasting CMP and lipid are pending continue therapy pending lab work

## 2022-08-24 NOTE — ASSESSMENT & PLAN NOTE
Tethered spinal cord release 2004 with neurogenic bladder self cathing 3-4 times per day for urinary retention currently stable

## 2022-12-06 RX ORDER — FAMOTIDINE 40 MG/1
TABLET, FILM COATED ORAL
Qty: 90 TABLET | Refills: 3 | Status: SHIPPED | OUTPATIENT
Start: 2022-12-06

## 2022-12-16 RX ORDER — ESTRADIOL 1 MG/1
1 TABLET ORAL DAILY
Qty: 90 TABLET | Refills: 0 | Status: SHIPPED | OUTPATIENT
Start: 2022-12-16 | End: 2023-03-13 | Stop reason: SDUPTHER

## 2022-12-27 RX ORDER — ACYCLOVIR 400 MG/1
TABLET ORAL
Qty: 12 TABLET | Refills: 1 | Status: SHIPPED | OUTPATIENT
Start: 2022-12-27

## 2023-01-06 RX ORDER — BUPROPION HYDROCHLORIDE 75 MG/1
TABLET ORAL
Qty: 180 TABLET | Refills: 3 | Status: SHIPPED | OUTPATIENT
Start: 2023-01-06

## 2023-01-06 NOTE — TELEPHONE ENCOUNTER
Rx Refill Note  Requested Prescriptions     Pending Prescriptions Disp Refills   • buPROPion (WELLBUTRIN) 75 MG tablet [Pharmacy Med Name: BUPROPION TAB 75MG] 180 tablet 3     Sig: TAKE 2 TABLETS DAILY      Last office visit with prescribing clinician: 8/24/2022   Last telemedicine visit with prescribing clinician: 2/27/2023   Next office visit with prescribing clinician: 2/27/2023                         Would you like a call back once the refill request has been completed: [] Yes [] No    If the office needs to give you a call back, can they leave a voicemail: [] Yes [] No    Hoa Oliveros LPN  01/06/23, 08:16 EST

## 2023-01-12 ENCOUNTER — OFFICE VISIT (OUTPATIENT)
Dept: OBSTETRICS AND GYNECOLOGY | Facility: CLINIC | Age: 58
End: 2023-01-12
Payer: COMMERCIAL

## 2023-01-12 VITALS
HEIGHT: 63 IN | BODY MASS INDEX: 27.32 KG/M2 | DIASTOLIC BLOOD PRESSURE: 80 MMHG | WEIGHT: 154.2 LBS | SYSTOLIC BLOOD PRESSURE: 118 MMHG

## 2023-01-12 DIAGNOSIS — N81.6 CYSTOCELE WITH RECTOCELE: ICD-10-CM

## 2023-01-12 DIAGNOSIS — N81.10 CYSTOCELE WITH RECTOCELE: ICD-10-CM

## 2023-01-12 DIAGNOSIS — Z01.411 ENCOUNTER FOR GYNECOLOGICAL EXAMINATION WITH ABNORMAL FINDING: Primary | ICD-10-CM

## 2023-01-12 DIAGNOSIS — K59.04 CHRONIC IDIOPATHIC CONSTIPATION: ICD-10-CM

## 2023-01-12 PROCEDURE — 99396 PREV VISIT EST AGE 40-64: CPT | Performed by: NURSE PRACTITIONER

## 2023-01-12 RX ORDER — PLECANATIDE 3 MG/1
1 TABLET ORAL DAILY
Qty: 30 TABLET | Refills: 3 | Status: SHIPPED | OUTPATIENT
Start: 2023-01-12 | End: 2023-02-27

## 2023-01-12 RX ORDER — FLUOROMETHOLONE 0.1 %
SUSPENSION, DROPS(FINAL DOSAGE FORM)(ML) OPHTHALMIC (EYE)
COMMUNITY
Start: 2022-12-15

## 2023-01-12 NOTE — PROGRESS NOTES
Chief Complaint  Padmini Cochran is a 57 y.o.  female presenting for Annual Exam (Cystocele and rectocele.  Had appointment with Dr. Rosario at  regarding repair, but has not done this yet.  Please evaluate/discuss.) and Med Refill (Estradiol 1 mg (#90 with refills).  Jose Chow.)    History of Present Illness  Pdamini is a very pleasant 58yo woman, , here for annual gyn exam.  She has had schedule conflicts with two different proposed dates for surgery at .  She isn't 100% sure that she wishes to go through it.  We discussed pessary.  She has neurogenic bladder and has to do self-caths ~4x/ day.  Never gets UTIs.  But, she does leak a little urine sometimes and has some pelvic pressure.  She battles chronic constipation.  She does have some LBP, but it has not changed.  Her surgical hx is noted below, and it includes BTL, endometrial ablation, robot asst TLH/BSO/VVS with posterior repair.  She has really been working on pelvic floor exercises this year, and it seems to me the rectocele/enterocele is a little better than last yr.  Preventive health procedures up to date.    The following portions of the patient's history were reviewed and updated as appropriate: allergies, current medications, past family history, past medical history, past social history, past surgical history and problem list.    Allergies   Allergen Reactions   • Levaquin [Levofloxacin] Confusion   • Hydrochlorothiazide Other (See Comments)     Multiple symptoms; DYAZIDE    • Triamterene Other (See Comments)     Multiple sympmtoms; DYAZIDE          Current Outpatient Medications:   •  buPROPion (WELLBUTRIN) 75 MG tablet, TAKE 2 TABLETS DAILY, Disp: 180 tablet, Rfl: 3  •  acyclovir (ZOVIRAX) 400 MG tablet, TAKE 1 TABLET BY MOUTH THREE TIMES DAILY. NO MORE THAN 5 DOSES A DAY, Disp: 12 tablet, Rfl: 1  •  atorvastatin (LIPITOR) 40 MG tablet, TAKE 1 TABLET DAILY        (INCREASED DOSE), Disp: 90 tablet, Rfl: 3  •  calcium  "carbonate (OS-ANNITA) 600 MG tablet, Take 600 mg by mouth daily., Disp: , Rfl:   •  carvedilol (COREG) 6.25 MG tablet, TAKE 1 TABLET 2 TIMES DAILYWITH FOOD, Disp: 180 tablet, Rfl: 3  •  cholecalciferol (VITAMIN D3) 1000 UNITS tablet, Take 1,000 Units by mouth daily., Disp: , Rfl:   •  diazePAM (VALIUM) 5 MG tablet, Take 1 tablet by mouth At Night As Needed for Anxiety., Disp: 30 tablet, Rfl: 2  •  estradiol (Estrace) 1 MG tablet, Take 1 tablet by mouth Daily., Disp: 90 tablet, Rfl: 0  •  famotidine (PEPCID) 40 MG tablet, TAKE 1 TABLET DAILY, Disp: 90 tablet, Rfl: 3  •  fluorometholone (FML) 0.1 % ophthalmic suspension, , Disp: , Rfl:   •  Plecanatide (Trulance) 3 MG tablet, Take 1 tablet by mouth Daily., Disp: 30 tablet, Rfl: 3    Past Medical History:   Diagnosis Date   • Colon polyp     nodular fold sessile serrated polyp   • Depression    • GERD (gastroesophageal reflux disease)    • Hyperlipidemia    • Hypertension    • Neurogenic bladder    • Post-menopause on HRT (hormone replacement therapy)    • Tethered spinal cord (HCC)    • Urinary retention         Past Surgical History:   Procedure Laterality Date   • COLONOSCOPY  06/2012    Inflammed ICV   • DILATATION AND CURETTAGE     • ENDOMETRIAL ABLATION     • ENDOMETRIAL ABLATION  2005    UTERINE  ABLATION   • ENTEROCELE REPAIR     • LAPAROSCOPIC HYSTERECTOMY  07/28/2014   • LAPAROSCOPIC TUBAL LIGATION     • OOPHORECTOMY Bilateral 07/2014   • PELVIC LAPAROSCOPY     • PERINEOPLASTY     • POSTERIOR REPAIR     • SPINAL CORD UNTETHERING     • SPINE SURGERY  2004    SPINAL CORD SURG   • TUBAL ABDOMINAL LIGATION  04/1999       Objective  /80   Ht 160.7 cm (63.25\")   Wt 69.9 kg (154 lb 3.2 oz)   Breastfeeding No   BMI 27.10 kg/m²     Physical Exam  Vitals and nursing note reviewed. Exam conducted with a chaperone present.   Constitutional:       Appearance: Normal appearance.   HENT:      Head: Normocephalic.   Neck:      Thyroid: No thyroid mass or thyromegaly. "   Cardiovascular:      Rate and Rhythm: Normal rate and regular rhythm.      Heart sounds: Normal heart sounds. No murmur heard.  Pulmonary:      Effort: Pulmonary effort is normal. No respiratory distress.      Breath sounds: Normal breath sounds.   Chest:   Breasts:     Right: No inverted nipple, mass or nipple discharge.      Left: No inverted nipple, mass or nipple discharge.   Abdominal:      Palpations: Abdomen is soft. There is no mass.      Tenderness: There is no abdominal tenderness.   Genitourinary:     General: Normal vulva.      Labia:         Right: No rash, tenderness or lesion.         Left: No rash, tenderness or lesion.       Vagina: Erythema and prolapsed vaginal walls present. No vaginal discharge.      Uterus: Absent.       Adnexa:         Right: No mass or tenderness.          Left: No mass or tenderness.        Comments: Vaginal mucosa is slightly erythematous, but no abnormal discharge.  2+cystocele/ 1-2+rectocele.    Anus appears wnl.  No rectal exam performed.  Lymphadenopathy:      Upper Body:      Right upper body: No supraclavicular or axillary adenopathy.      Left upper body: No supraclavicular or axillary adenopathy.   Skin:     General: Skin is warm and dry.   Neurological:      Mental Status: She is alert and oriented to person, place, and time.   Psychiatric:         Mood and Affect: Mood normal.         Behavior: Behavior normal.         Assessment/Plan   Diagnoses and all orders for this visit:    1. Encounter for gynecological examination with abnormal finding (Primary)    2. Cystocele with rectocele    3. Chronic idiopathic constipation  -     Plecanatide (Trulance) 3 MG tablet; Take 1 tablet by mouth Daily.  Dispense: 30 tablet; Refill: 3    She has failed on all OTC stool softeners, laxatives, and fiber supplements.  Couns re: fld & fiber.  Will give sample trial of Trulance.      Procedures    40 to 64: Counseling/Anticipatory Guidance Discussed: nutrition, physical  activity, screenings and self-breast exam    Return in about 1 year (around 1/12/2024) for Annual physical.    Rosemarie Pennington, APRN  01/12/2023

## 2023-01-27 ENCOUNTER — TELEPHONE (OUTPATIENT)
Dept: OBSTETRICS AND GYNECOLOGY | Facility: CLINIC | Age: 58
End: 2023-01-27
Payer: COMMERCIAL

## 2023-01-27 NOTE — TELEPHONE ENCOUNTER
Patient was given samples of Trulance at the time of her last office visit.  We received a prior authorization request, and Deloris suggested waiting to do this until we heard from the patient.  She was given samples to try.  As of today we had not heard from Padmini, so I called to see how she is doing.  She did not try samples because she states Deloris told her that the drug probably would not be covered by her insurance.  She is willing to try if we can get it approved.  I will do prior auth.  She has failed OTC meds but no prescription medications.

## 2023-02-27 ENCOUNTER — LAB (OUTPATIENT)
Dept: LAB | Facility: HOSPITAL | Age: 58
End: 2023-02-27
Payer: COMMERCIAL

## 2023-02-27 ENCOUNTER — OFFICE VISIT (OUTPATIENT)
Dept: INTERNAL MEDICINE | Facility: CLINIC | Age: 58
End: 2023-02-27
Payer: COMMERCIAL

## 2023-02-27 VITALS
TEMPERATURE: 97.7 F | SYSTOLIC BLOOD PRESSURE: 116 MMHG | HEIGHT: 63 IN | DIASTOLIC BLOOD PRESSURE: 78 MMHG | BODY MASS INDEX: 27.5 KG/M2 | HEART RATE: 84 BPM | WEIGHT: 155.2 LBS

## 2023-02-27 DIAGNOSIS — I10 BENIGN ESSENTIAL HYPERTENSION: ICD-10-CM

## 2023-02-27 DIAGNOSIS — K21.9 GASTROESOPHAGEAL REFLUX DISEASE WITHOUT ESOPHAGITIS: ICD-10-CM

## 2023-02-27 DIAGNOSIS — R33.9 URINARY RETENTION: ICD-10-CM

## 2023-02-27 DIAGNOSIS — N31.9 NEUROGENIC BLADDER: ICD-10-CM

## 2023-02-27 DIAGNOSIS — E78.2 MIXED HYPERLIPIDEMIA: Primary | ICD-10-CM

## 2023-02-27 DIAGNOSIS — F41.9 ANXIETY: ICD-10-CM

## 2023-02-27 DIAGNOSIS — E78.2 MIXED HYPERLIPIDEMIA: ICD-10-CM

## 2023-02-27 PROCEDURE — 80061 LIPID PANEL: CPT

## 2023-02-27 PROCEDURE — 80053 COMPREHEN METABOLIC PANEL: CPT

## 2023-02-27 PROCEDURE — 99214 OFFICE O/P EST MOD 30 MIN: CPT | Performed by: INTERNAL MEDICINE

## 2023-02-27 RX ORDER — DIAZEPAM 5 MG/1
5 TABLET ORAL NIGHTLY PRN
Qty: 30 TABLET | Refills: 2 | Status: SHIPPED | OUTPATIENT
Start: 2023-02-27

## 2023-02-27 RX ORDER — CYCLOSPORINE 0.5 MG/ML
1 EMULSION OPHTHALMIC 2 TIMES DAILY
COMMUNITY
Start: 2023-02-04

## 2023-02-27 NOTE — PATIENT INSTRUCTIONS
Fasting CMP and lipid pending  Continue all current therapy  Refill Valium 5 mg  Suggest Valium 2.5 mg alternate with Tylenol PM every other night for insomnia  Continue current exercise  Continue current diet  See patient back for preventative visit in 6 months  Patient may schedule for surgery for prolapse bladder at  in mid summer and will need a preop office clearance with lab and EKG she will inform us.

## 2023-02-27 NOTE — PROGRESS NOTES
[facility]     Padmini Cochran  1965   [id number]      Patient Care Team:  Ronnie Mas MD as PCP - General (Internal Medicine)  Gorge, LEO Forte as Nurse Practitioner (Obstetrics and Gynecology)    Chief Complaint::   Chief Complaint   Patient presents with   • Hypertension   • Hyperlipidemia   • Insomnia        HPI  The patient is a 57-year-old female office visit for essential hypertension, mixed hyperlipidemia, insomnia with reflux, neurogenic bladder, and urinary retention with self-catheterization.    The patient reports that she is experiencing trouble sleeping. She has trouble going to sleep and going back to sleep. The patient usually gets up at 3:00 AM or 4:00 AM to go to the restroom and goes to bed between 10:00 PM and 11:00 PM. She admits to taking 0.5 of Valium at night which does help alleviate her symptoms. The patient adds that she usually takes Valium when she wakes up at night.   She denies any headaches, dizziness, difficulty with swallowing, coughing, wheezing or shortness of breath. She denies any chest pain, palpitations or upset stomach. The patient currently performs self-catheterizations and admits her bladder is doing well. She admits her upper and lower extremities are doing well.     The patient is currently fasting.     She denies drinking caffeine before bed.     The patient reports she is scheduled for surgery this summer of 2023 regarding her prolapsed bladder. She admits she needs an EKG performed to be cleared for surgery. She adds they are performing the surgery laparoscopically with mesh. When the patient had spinal cord surgery, mesh was used.     She is due for a colonoscopy this summer in 2023.       Patient Active Problem List   Diagnosis   • Urinary retention   • Tethered spinal cord (HCC)   • Neurogenic bladder   • GERD (gastroesophageal reflux disease)   • Depression   • Irritable bowel syndrome with diarrhea   • Fibrocystic breast  changes, bilateral   • Colon polyp   • Mixed hyperlipidemia   • Benign essential hypertension   • Gastroesophageal reflux disease without esophagitis   • Major depressive disorder, recurrent, mild (HCC)   • Anxiety   • Esophagitis, reflux   • Obesity (BMI 30.0-34.9)   • History of spinal cord compression   • Vitamin D deficiency   • Raynaud's syndrome   • Chest pain, unspecified   • Family history of diabetes mellitus   • Hyperlipidemia   • Vaginal enterocele   • Carpal tunnel syndrome of right wrist   • Preventative health care   • Post-menopause on HRT (hormone replacement therapy)   • Preventative health care   • Palpitations   • Preventative health care   • COVID-19 virus infection        Past Medical History:   Diagnosis Date   • Colon polyp     nodular fold sessile serrated polyp   • Depression    • GERD (gastroesophageal reflux disease)    • Hyperlipidemia    • Hypertension    • Neurogenic bladder    • Post-menopause on HRT (hormone replacement therapy)    • Tethered spinal cord (HCC)    • Urinary retention        Past Surgical History:   Procedure Laterality Date   • COLONOSCOPY  06/2012    Inflammed ICV   • DILATATION AND CURETTAGE     • ENDOMETRIAL ABLATION     • ENDOMETRIAL ABLATION  2005    UTERINE  ABLATION   • ENTEROCELE REPAIR     • LAPAROSCOPIC HYSTERECTOMY  07/28/2014   • LAPAROSCOPIC TUBAL LIGATION     • OOPHORECTOMY Bilateral 07/2014   • PELVIC LAPAROSCOPY     • PERINEOPLASTY     • POSTERIOR REPAIR     • SPINAL CORD UNTETHERING     • SPINE SURGERY  2004    SPINAL CORD SURG   • TUBAL ABDOMINAL LIGATION  04/1999       Family History   Problem Relation Age of Onset   • Cancer Maternal Grandmother         COLON   • Colon cancer Maternal Grandmother    • Coronary artery disease Maternal Grandmother    • Lymphoma Father    • Hypertension Father    • Pancreatic cancer Mother    • Diabetes Mother    • Coronary artery disease Mother         premature; angioplasty 8 09   • Hypertension Mother    •  "Alzheimer's disease Paternal Grandmother    • Coronary artery disease Maternal Grandfather    • Migraines Sister    • Breast cancer Neg Hx    • Ovarian cancer Neg Hx        Social History     Socioeconomic History   • Marital status:    Tobacco Use   • Smoking status: Never   • Smokeless tobacco: Never   Vaping Use   • Vaping Use: Never used   Substance and Sexual Activity   • Alcohol use: No   • Drug use: No   • Sexual activity: Yes     Partners: Male     Birth control/protection: Surgical, Post-menopausal       Allergies   Allergen Reactions   • Levaquin [Levofloxacin] Confusion   • Hydrochlorothiazide Other (See Comments)     Multiple symptoms; DYAZIDE    • Triamterene Other (See Comments)     Multiple sympmtoms; DYAZIDE        Review of Systems     HEENT: Denies any hearing changes, eyesight changes, no headache or dizziness  NECK: Denies any pain, stiffness, swelling or dysphagia  CHEST: Denies cough or wheeze or recent lung infections  CARDIAC: Denies chest pain, pressure or palpitations  ABD: Denies nausea, vomiting or abdominal pain  : Denies dysuria neurogenic bladder with urinary retention with chronic In-N-Out cath 3-4 times per day overall doing well with no recent UTIs  NEURO: Denies syncope, concussion, neuropathy  PSYCH: Denies any stress mild anxiety stress on Wellbutrin 150 mg and Valium 2.5 mg as needed and or at bedtime  EXTREM: Denies arthritic changes myalgia or arthralgia  SKIN: Denies any rash    Vital Signs  Vitals:    02/27/23 1259   BP: 116/78   BP Location: Left arm   Patient Position: Sitting   Cuff Size: Adult   Pulse: 84   Temp: 97.7 °F (36.5 °C)   Weight: 70.4 kg (155 lb 3.2 oz)   Height: 160.7 cm (63.27\")   PainSc: 0-No pain     Body mass index is 27.26 kg/m².  BMI is >= 25 and <30. (Overweight) The following options were offered after discussion;: exercise counseling/recommendations and nutrition counseling/recommendations     Advance Care Planning         Current Outpatient " Medications:   •  acyclovir (ZOVIRAX) 400 MG tablet, TAKE 1 TABLET BY MOUTH THREE TIMES DAILY. NO MORE THAN 5 DOSES A DAY, Disp: 12 tablet, Rfl: 1  •  atorvastatin (LIPITOR) 40 MG tablet, TAKE 1 TABLET DAILY        (INCREASED DOSE), Disp: 90 tablet, Rfl: 3  •  buPROPion (WELLBUTRIN) 75 MG tablet, TAKE 2 TABLETS DAILY, Disp: 180 tablet, Rfl: 3  •  calcium carbonate (OS-ANNITA) 600 MG tablet, Take 600 mg by mouth daily., Disp: , Rfl:   •  carvedilol (COREG) 6.25 MG tablet, TAKE 1 TABLET 2 TIMES DAILYWITH FOOD, Disp: 180 tablet, Rfl: 3  •  cholecalciferol (VITAMIN D3) 1000 UNITS tablet, Take 1,000 Units by mouth daily., Disp: , Rfl:   •  cycloSPORINE (RESTASIS) 0.05 % ophthalmic emulsion, Apply 1 drop to eye(s) as directed by provider 2 (Two) Times a Day. Both eyes, Disp: , Rfl:   •  diazePAM (VALIUM) 5 MG tablet, Take 1 tablet by mouth At Night As Needed for Anxiety., Disp: 30 tablet, Rfl: 2  •  estradiol (Estrace) 1 MG tablet, Take 1 tablet by mouth Daily., Disp: 90 tablet, Rfl: 0  •  famotidine (PEPCID) 40 MG tablet, TAKE 1 TABLET DAILY, Disp: 90 tablet, Rfl: 3  •  fluorometholone (FML) 0.1 % ophthalmic suspension, , Disp: , Rfl:     Physical Exam     ACE III MINI      HEENT: Pupils equal reactive ENT clear, no facial asymmetry, pharynx is clear  NECK: No masses bruit or thyromegaly  CHEST: Clear without rales wheezes or rhonchi  CARDIAC: Regular rhythm without gallop rub or click, blood pressure heart is 130/78 mmHg in both upper extremities, rate stable  ABD: Liver spleen normal, good bowel sounds, nontender  : Deferred  NEURO: Intact  PSYCH: Normal insomnia and mild anxiety  EXTREM: No significant arthritic changes, no edema  SKIN: Clear     Results Review:    No results found for this or any previous visit (from the past 672 hour(s)).  Procedures fasting CMP and lipid pending    Medication Review: Medications reviewed and noted    Patient wellness counseling  Exercise: Encouraged walking exercise  Diet:  Encouraged healthy cardiac diet reduce carbs more portions weight loss  Screening: Fasting CMP and lipid are pending  Social History     Socioeconomic History   • Marital status:    Tobacco Use   • Smoking status: Never   • Smokeless tobacco: Never   Vaping Use   • Vaping Use: Never used   Substance and Sexual Activity   • Alcohol use: No   • Drug use: No   • Sexual activity: Yes     Partners: Male     Birth control/protection: Surgical, Post-menopausal        Assessment/Plan:    Problem List Items Addressed This Visit        Cardiac and Vasculature    Mixed hyperlipidemia - Primary    Overview     History of mixed hyperlipidemia on atorvastatin 40 mg daily denies myalgia arthralgia         Current Assessment & Plan       Mixed hyperlipidemia on atorvastatin 40 mg daily denies myalgia or arthralgia, continue therapy, fasting CMP and lipid are pending         Relevant Medications    atorvastatin (LIPITOR) 40 MG tablet    Other Relevant Orders    Comprehensive Metabolic Panel    Lipid Panel    Benign essential hypertension    Overview     History of essential hypertension on carvedilol 6.25 twice daily         Current Assessment & Plan       Essential hypertension with current blood pressure 116/78 and heart rate of 84 on carvedilol 6.25 mg twice daily continue therapy, CMP is pending         Relevant Medications    carvedilol (COREG) 6.25 MG tablet    Other Relevant Orders    Comprehensive Metabolic Panel    Lipid Panel       Gastrointestinal Abdominal     GERD (gastroesophageal reflux disease)    Overview     History of GERD gastritis previously on omeprazole 20 mg daily which is effective in his therapy however insurance is having difficulty covering the cost of the medication.  Will change to H2 blocker famotidine 40 mg daily         Current Assessment & Plan     GERD reflux on famotidine 40 mg daily denies nausea vomiting abdominal pain continue famotidine         Relevant Medications    famotidine  (PEPCID) 40 MG tablet    Other Relevant Orders    Comprehensive Metabolic Panel    Lipid Panel       Genitourinary and Reproductive     Urinary retention    Overview     Urinary retention since 2003, with tethered spinal cord surgery 2004 currently asymptomatic and doing self cath 3-4 times per day         Current Assessment & Plan     Urinary retention and neurogenic bladder requiring self cath since spinal cord surgery of 2004 overall doing well with self cathing 3-4 times per day and no recent urinary tract infections continue self cath         Relevant Orders    Comprehensive Metabolic Panel    Lipid Panel    Neurogenic bladder    Overview     Patient has neurogenic bladder requiring self catheter catheterization 3-4 times per 24 hours no recent infections stable.         Current Assessment & Plan     Neurogenic bladder with self cath 3-4 times per day continue therapy         Relevant Orders    Comprehensive Metabolic Panel    Lipid Panel       Mental Health    Anxiety    Overview     Mild anxiety treated with Valium 5 mg daily as needed         Current Assessment & Plan     Mild anxiety well controlled with Wellbutrin 150 mg daily and Valium 5 mg half tablet as needed is overall doing well some stress over potential bladder prolapse surgery mesh repair possibly this late summer, the patient does have some insomnia and has been taking half of Valium or 2.5 mg at at bedtime as needed I have suggested alternating Valium 2.5 mg at at bedtime with Tylenol Benadryl taken approximately 1 hour prior to sleep and to reduce water intake some 2 hours before rest-sleep         Relevant Medications    diazePAM (VALIUM) 5 MG tablet    Other Relevant Orders    Comprehensive Metabolic Panel    Lipid Panel        Patient Instructions   Fasting CMP and lipid pending  Continue all current therapy  Refill Valium 5 mg  Suggest Valium 2.5 mg alternate with Tylenol PM every other night for insomnia  Continue current  exercise  Continue current diet  See patient back for preventative visit in 6 months  Patient may schedule for surgery for prolapse bladder at  in mid summer and will need a preop office clearance with lab and EKG she will inform us.       Plan of care reviewed with patient at the conclusion of today's visit. Education was provided regarding diagnosis, management, and any prescribed or recommended OTC medications.Patient verbalizes understanding of and agreement with management plan.         Ronnie Mas MD      Note: Part of this note may be an electronic transcription/translation of spoken language to printed text using the Dragon Dictation system.      Answers for HPI/ROS submitted by the patient on 2/24/2023  Please describe your symptoms.: Trouble sleeping  Have you had these symptoms before?: Yes  How long have you been having these symptoms?: Greater than 2 weeks  Please list any medications you are currently taking for this condition.: Sometimes Tylenol pm  What is the primary reason for your visit?: Other    Transcribed from ambient dictation for Ronnie Mas MD by Crystal Torres.  02/27/23   13:55 EST    Patient or patient representative verbalized consent to the visit recording.  I have personally performed the services described in this document as transcribed by the above individual, and it is both accurate and complete.  Ronnie Mas MD  2/27/2023  18:50 EST  Crystal Torres

## 2023-02-27 NOTE — ASSESSMENT & PLAN NOTE
Essential hypertension with current blood pressure 116/78 and heart rate of 84 on carvedilol 6.25 mg twice daily continue therapy, CMP is pending

## 2023-02-27 NOTE — ASSESSMENT & PLAN NOTE
Urinary retention and neurogenic bladder requiring self cath since spinal cord surgery of 2004 overall doing well with self cathing 3-4 times per day and no recent urinary tract infections continue self cath

## 2023-02-27 NOTE — ASSESSMENT & PLAN NOTE
Mild anxiety well controlled with Wellbutrin 150 mg daily and Valium 5 mg half tablet as needed is overall doing well some stress over potential bladder prolapse surgery mesh repair possibly this late summer, the patient does have some insomnia and has been taking half of Valium or 2.5 mg at at bedtime as needed I have suggested alternating Valium 2.5 mg at at bedtime with Tylenol Benadryl taken approximately 1 hour prior to sleep and to reduce water intake some 2 hours before rest-sleep

## 2023-02-27 NOTE — ASSESSMENT & PLAN NOTE
Mixed hyperlipidemia on atorvastatin 40 mg daily denies myalgia or arthralgia, continue therapy, fasting CMP and lipid are pending

## 2023-02-28 LAB
ALBUMIN SERPL-MCNC: 4.4 G/DL (ref 3.5–5.2)
ALBUMIN/GLOB SERPL: 1.2 G/DL
ALP SERPL-CCNC: 88 U/L (ref 39–117)
ALT SERPL W P-5'-P-CCNC: 12 U/L (ref 1–33)
ANION GAP SERPL CALCULATED.3IONS-SCNC: 8 MMOL/L (ref 5–15)
AST SERPL-CCNC: 13 U/L (ref 1–32)
BILIRUB SERPL-MCNC: 0.4 MG/DL (ref 0–1.2)
BUN SERPL-MCNC: 13 MG/DL (ref 6–20)
BUN/CREAT SERPL: 15.7 (ref 7–25)
CALCIUM SPEC-SCNC: 9.9 MG/DL (ref 8.6–10.5)
CHLORIDE SERPL-SCNC: 106 MMOL/L (ref 98–107)
CHOLEST SERPL-MCNC: 181 MG/DL (ref 0–200)
CO2 SERPL-SCNC: 28 MMOL/L (ref 22–29)
CREAT SERPL-MCNC: 0.83 MG/DL (ref 0.57–1)
EGFRCR SERPLBLD CKD-EPI 2021: 82.9 ML/MIN/1.73
GLOBULIN UR ELPH-MCNC: 3.7 GM/DL
GLUCOSE SERPL-MCNC: 81 MG/DL (ref 65–99)
HDLC SERPL-MCNC: 55 MG/DL (ref 40–60)
LDLC SERPL CALC-MCNC: 91 MG/DL (ref 0–100)
LDLC/HDLC SERPL: 1.53 {RATIO}
POTASSIUM SERPL-SCNC: 4.6 MMOL/L (ref 3.5–5.2)
PROT SERPL-MCNC: 8.1 G/DL (ref 6–8.5)
SODIUM SERPL-SCNC: 142 MMOL/L (ref 136–145)
TRIGL SERPL-MCNC: 210 MG/DL (ref 0–150)
VLDLC SERPL-MCNC: 35 MG/DL (ref 5–40)

## 2023-03-09 RX ORDER — ATORVASTATIN CALCIUM 40 MG/1
TABLET, FILM COATED ORAL
Qty: 90 TABLET | Refills: 3 | Status: SHIPPED | OUTPATIENT
Start: 2023-03-09

## 2023-03-09 RX ORDER — CARVEDILOL 6.25 MG/1
TABLET ORAL
Qty: 180 TABLET | Refills: 3 | Status: SHIPPED | OUTPATIENT
Start: 2023-03-09

## 2023-03-13 RX ORDER — ESTRADIOL 1 MG/1
1 TABLET ORAL DAILY
Qty: 90 TABLET | Refills: 3 | Status: SHIPPED | OUTPATIENT
Start: 2023-03-13

## 2023-03-13 NOTE — TELEPHONE ENCOUNTER
Rx Refill Note  We have received a fax refill request from patient's pharmacy.  Next appointment 1/17/24.  Requested Prescriptions      No prescriptions requested or ordered in this encounter      Last office visit with prescribing clinician: 1/12/2023   Last telemedicine visit with prescribing clinician: Visit date not found   Next office visit with prescribing clinician: 1/17/2024                         Would you like a call back once the refill request has been completed: [] Yes [] No    If the office needs to give you a call back, can they leave a voicemail: [] Yes [] No    Bria Miner MA  03/13/23, 17:06 EDT

## 2023-05-19 ENCOUNTER — TRANSCRIBE ORDERS (OUTPATIENT)
Dept: ADMINISTRATIVE | Facility: HOSPITAL | Age: 58
End: 2023-05-19
Payer: COMMERCIAL

## 2023-05-19 DIAGNOSIS — Z12.31 VISIT FOR SCREENING MAMMOGRAM: Primary | ICD-10-CM

## 2023-06-14 ENCOUNTER — TELEPHONE (OUTPATIENT)
Dept: OBSTETRICS AND GYNECOLOGY | Facility: CLINIC | Age: 58
End: 2023-06-14
Payer: COMMERCIAL

## 2023-06-14 RX ORDER — PLECANATIDE 3 MG/1
1 TABLET ORAL DAILY
Qty: 15 TABLET | Refills: 0 | COMMUNITY
Start: 2023-06-14

## 2023-08-27 PROBLEM — Z00.00 PREVENTATIVE HEALTH CARE: Status: ACTIVE | Noted: 2023-08-27

## 2023-08-28 ENCOUNTER — LAB (OUTPATIENT)
Dept: LAB | Facility: HOSPITAL | Age: 58
End: 2023-08-28
Payer: COMMERCIAL

## 2023-08-28 ENCOUNTER — OFFICE VISIT (OUTPATIENT)
Dept: INTERNAL MEDICINE | Facility: CLINIC | Age: 58
End: 2023-08-28
Payer: COMMERCIAL

## 2023-08-28 VITALS
WEIGHT: 157 LBS | SYSTOLIC BLOOD PRESSURE: 132 MMHG | BODY MASS INDEX: 27.82 KG/M2 | HEART RATE: 60 BPM | HEIGHT: 63 IN | DIASTOLIC BLOOD PRESSURE: 82 MMHG

## 2023-08-28 DIAGNOSIS — N31.9 NEUROGENIC BLADDER: ICD-10-CM

## 2023-08-28 DIAGNOSIS — I73.00 RAYNAUD'S DISEASE WITHOUT GANGRENE: ICD-10-CM

## 2023-08-28 DIAGNOSIS — E78.2 MIXED HYPERLIPIDEMIA: ICD-10-CM

## 2023-08-28 DIAGNOSIS — Q06.8 TETHERED SPINAL CORD: ICD-10-CM

## 2023-08-28 DIAGNOSIS — E55.9 VITAMIN D DEFICIENCY: ICD-10-CM

## 2023-08-28 DIAGNOSIS — Z00.00 PREVENTATIVE HEALTH CARE: Primary | ICD-10-CM

## 2023-08-28 DIAGNOSIS — K21.9 GASTROESOPHAGEAL REFLUX DISEASE WITHOUT ESOPHAGITIS: ICD-10-CM

## 2023-08-28 DIAGNOSIS — I10 BENIGN ESSENTIAL HYPERTENSION: ICD-10-CM

## 2023-08-28 DIAGNOSIS — Z00.00 PREVENTATIVE HEALTH CARE: ICD-10-CM

## 2023-08-28 LAB
BASOPHILS # BLD AUTO: 0.07 10*3/MM3 (ref 0–0.2)
BASOPHILS NFR BLD AUTO: 1 % (ref 0–1.5)
DEPRECATED RDW RBC AUTO: 39.2 FL (ref 37–54)
EOSINOPHIL # BLD AUTO: 0.35 10*3/MM3 (ref 0–0.4)
EOSINOPHIL NFR BLD AUTO: 4.9 % (ref 0.3–6.2)
ERYTHROCYTE [DISTWIDTH] IN BLOOD BY AUTOMATED COUNT: 12.1 % (ref 12.3–15.4)
HCT VFR BLD AUTO: 40.3 % (ref 34–46.6)
HGB BLD-MCNC: 13.4 G/DL (ref 12–15.9)
IMM GRANULOCYTES # BLD AUTO: 0.03 10*3/MM3 (ref 0–0.05)
IMM GRANULOCYTES NFR BLD AUTO: 0.4 % (ref 0–0.5)
LYMPHOCYTES # BLD AUTO: 1.74 10*3/MM3 (ref 0.7–3.1)
LYMPHOCYTES NFR BLD AUTO: 24.3 % (ref 19.6–45.3)
MCH RBC QN AUTO: 29.3 PG (ref 26.6–33)
MCHC RBC AUTO-ENTMCNC: 33.3 G/DL (ref 31.5–35.7)
MCV RBC AUTO: 88.2 FL (ref 79–97)
MONOCYTES # BLD AUTO: 0.6 10*3/MM3 (ref 0.1–0.9)
MONOCYTES NFR BLD AUTO: 8.4 % (ref 5–12)
NEUTROPHILS NFR BLD AUTO: 4.37 10*3/MM3 (ref 1.7–7)
NEUTROPHILS NFR BLD AUTO: 61 % (ref 42.7–76)
NRBC BLD AUTO-RTO: 0 /100 WBC (ref 0–0.2)
PLATELET # BLD AUTO: 300 10*3/MM3 (ref 140–450)
PMV BLD AUTO: 9.6 FL (ref 6–12)
RBC # BLD AUTO: 4.57 10*6/MM3 (ref 3.77–5.28)
WBC NRBC COR # BLD: 7.16 10*3/MM3 (ref 3.4–10.8)

## 2023-08-28 PROCEDURE — 80061 LIPID PANEL: CPT

## 2023-08-28 PROCEDURE — 80050 GENERAL HEALTH PANEL: CPT

## 2023-08-28 RX ORDER — ESTRADIOL 0.1 MG/G
2 CREAM VAGINAL 2 TIMES WEEKLY
COMMUNITY

## 2023-08-28 NOTE — ASSESSMENT & PLAN NOTE
Tethered spinal cord release surgery by Dr. Casey Clark 2004 with current self cath previously 3-4 times per day but since her vaginal reconstruction has had increased frequency and some urinary incontinence with procedure planned for the fall 2023 for injections around the urethral by the [facility] gynecologist

## 2023-08-28 NOTE — PROGRESS NOTES
[facility]     Padmini Cochran  1965   [id number]      Patient Care Team:  Ronnie Mas MD as PCP - General (Internal Medicine)  [facility], LEO Forte as Nurse Practitioner (Obstetrics and Gynecology)    Chief Complaint::   Chief Complaint   Patient presents with    Annual Exam     Patient is fasting    s/p GYN surgery     5/2023 by Dr. Rosario for vaginal vault prolapse        HPI  The patient presents for an annual preventative visit and physical examination. She is fasting. She recently had GYN surgery in 05/2023 for vaginal vault prolapse with a history of mixed hyperlipidemia, vitamin D deficiency, reflux, neurogenic bladder, and remote tethered spinal cord surgery.    The patient is doing well. She had surgery on 05/17/2023 for a vaginal vault prolapse by Dr. Rosario at . She had a hysterectomy, and the pouch was falling, so they used mesh. Since the surgery, it has made her bladder leakage a little worse. She is having a procedure called Bulkamid in 10/2023. She must do a self-catheterization 7 times a day or more, sometimes less depending on how much she drinks. She denies any headache, dizziness, or lightheadedness. Her vision is doing well, and she wears glasses constantly. She denies any cataracts or glaucoma. She denies any trouble with allergy drainage, nose, throat, or hearing. She denies any difficulty with sore throat, pain, or dysphagia. She has never been a smoker. She denies any coughing, wheezing, or lung infections. She denies any breast pain or soreness. Her mammogram is up to date. She denies any difficulty with her heart racing or palpitations. She denies any upset stomach, nausea, or vomiting. The surgery made her constipation improved. She has a colonoscopy scheduled for 09/01/2023 with Dr. Natalie Bernal. She denies any difficulty with her hips, knees, or ankles. Her hands and feet stay cold even in the summertime. They turn white every time she goes out when  it is cold.      Patient Active Problem List   Diagnosis    Urinary retention    Tethered spinal cord    Neurogenic bladder    GERD (gastroesophageal reflux disease)    Depression    Irritable bowel syndrome with diarrhea    Fibrocystic breast changes, bilateral    Colon polyp    Mixed hyperlipidemia    Benign essential hypertension    Gastroesophageal reflux disease without esophagitis    Major depressive disorder, recurrent, mild    Anxiety    Esophagitis, reflux    Obesity (BMI 30.0-34.9)    History of spinal cord compression    Vitamin D deficiency    Raynaud's syndrome    Chest pain, unspecified    Family history of diabetes mellitus    Hyperlipidemia    Vaginal enterocele    Carpal tunnel syndrome of right wrist    Preventative health care    Post-menopause on HRT (hormone replacement therapy)    Preventative health care    Palpitations    Preventative health care    COVID-19 virus infection    Preventative health care        Past Medical History:   Diagnosis Date    Colon polyp     nodular fold sessile serrated polyp    Depression     GERD (gastroesophageal reflux disease)     Hyperlipidemia     Hypertension     Neurogenic bladder     Post-menopause on HRT (hormone replacement therapy)     Tethered spinal cord     Urinary retention        Past Surgical History:   Procedure Laterality Date    COLONOSCOPY  06/2012    Inflammed ICV    DILATATION AND CURETTAGE      ENDOMETRIAL ABLATION      ENDOMETRIAL ABLATION  2005    UTERINE  ABLATION    ENTEROCELE REPAIR      LAPAROSCOPIC HYSTERECTOMY  07/28/2014    LAPAROSCOPIC TUBAL LIGATION      OOPHORECTOMY Bilateral 07/2014    PELVIC LAPAROSCOPY      PERINEOPLASTY      POSTERIOR REPAIR      SPINAL CORD UNTETHERING      SPINE SURGERY  2004    SPINAL CORD SURG    TUBAL ABDOMINAL LIGATION  04/1999       Family History   Problem Relation Age of Onset    Cancer Maternal Grandmother         COLON    Colon cancer Maternal Grandmother     Coronary artery disease Maternal  Grandmother     Lymphoma Father     Hypertension Father     Pancreatic cancer Mother     Diabetes Mother     Coronary artery disease Mother         premature; angioplasty 8 09    Hypertension Mother     Alzheimer's disease Paternal Grandmother     Coronary artery disease Maternal Grandfather     Migraines Sister     Breast cancer Neg Hx     Ovarian cancer Neg Hx        Social History     Socioeconomic History    Marital status:    Tobacco Use    Smoking status: Never    Smokeless tobacco: Never   Vaping Use    Vaping Use: Never used   Substance and Sexual Activity    Alcohol use: No    Drug use: No    Sexual activity: Yes     Partners: Male     Birth control/protection: Surgical, Post-menopausal       Allergies   Allergen Reactions    Levaquin [Levofloxacin] Confusion    Hydrochlorothiazide Other (See Comments)     Multiple symptoms; DYAZIDE     Triamterene Other (See Comments)     Multiple sympmtoms; DYAZIDE        Immunization History   Administered Date(s) Administered    COVID-19 (PFIZER) Purple Cap Monovalent 03/18/2021, 04/13/2021, 12/03/2021    Flu Vaccine Intradermal Quad 18-64YR 11/24/2018    Fluzone >6mos 11/03/2019, 10/22/2020    Hepatitis A 02/18/2019, 09/16/2019    Influenza, Unspecified 11/08/2021, 10/25/2022    Tdap 12/30/2015        Health Maintenance Due   Topic Date Due    ZOSTER VACCINE (1 of 2) Never done    HEPATITIS C SCREENING  Never done    COVID-19 Vaccine (4 - Pfizer series) 01/28/2022        Review of Systems     HEENT: Denies any hearing changes, eyesight changes, no headache or dizziness  NECK: Denies any pain, stiffness, swelling or dysphagia  CHEST: Denies cough or wheeze or recent lung infections  CARDIAC: Denies chest pain, pressure or palpitations  ABD: Denies nausea, vomiting or abdominal pain  : Denies dysuria is chronic urinary neurogenic bladder with self cath and recent vaginal reconstruction with change in the urethra after the cath more frequently because of urinary  "incontinence to receive urologic procedure in the near future  NEURO: Denies syncope, concussion, neuropathy  PSYCH: Denies any stress  EXTREM: Denies arthritic changes myalgia or arthralgia  SKIN: Denies any rash    Vital Signs  Vitals:    08/28/23 1022 08/28/23 1107   BP: 132/84 132/82   BP Location: Right arm    Patient Position: Sitting    Cuff Size: Adult    Pulse: 60    Weight: 71.2 kg (157 lb)    Height: 160 cm (63\")    PainSc: 0-No pain      Body mass index is 27.81 kg/mý.        Advance Care Planning         Current Outpatient Medications:     acyclovir (ZOVIRAX) 400 MG tablet, TAKE 1 TABLET BY MOUTH THREE TIMES DAILY. NO MORE THAN 5 DOSES A DAY, Disp: 12 tablet, Rfl: 1    atorvastatin (LIPITOR) 40 MG tablet, TAKE 1 TABLET DAILY        (INCREASED DOSE), Disp: 90 tablet, Rfl: 3    buPROPion (WELLBUTRIN) 75 MG tablet, TAKE 2 TABLETS DAILY, Disp: 180 tablet, Rfl: 3    calcium carbonate (OS-ANNITA) 600 MG tablet, Take 1 tablet by mouth Daily., Disp: , Rfl:     carvedilol (COREG) 6.25 MG tablet, TAKE 1 TABLET 2 TIMES DAILYWITH FOOD, Disp: 180 tablet, Rfl: 3    cholecalciferol (VITAMIN D3) 1000 UNITS tablet, Take 1 tablet by mouth Daily., Disp: , Rfl:     cycloSPORINE (RESTASIS) 0.05 % ophthalmic emulsion, Apply 1 drop to eye(s) as directed by provider 2 (Two) Times a Day. Both eyes, Disp: , Rfl:     diazePAM (VALIUM) 5 MG tablet, Take 1 tablet by mouth At Night As Needed for Anxiety., Disp: 30 tablet, Rfl: 2    estradiol (ESTRACE) 0.1 MG/GM vaginal cream, Insert 2 g into the vagina 2 (Two) Times a Week., Disp: , Rfl:     estradiol (Estrace) 1 MG tablet, Take 1 tablet by mouth Daily., Disp: 90 tablet, Rfl: 3    famotidine (PEPCID) 40 MG tablet, TAKE 1 TABLET DAILY, Disp: 90 tablet, Rfl: 3    fluorometholone (FML) 0.1 % ophthalmic suspension, , Disp: , Rfl:     Plecanatide (Trulance) 3 MG tablet, Take 1 tablet by mouth Daily., Disp: 15 tablet, Rfl: 0    Physical Exam   HEENT: Pupils equal reactive ENT clear, no " facial asymmetry, the pharynx is clear  NECK: No masses bruit or thyromegaly  CHEST: Clear without rales wheezes or rhonchi  CARDIAC: Regular rhythm without gallop rub or click, blood pressure is 144/86 mmHg and 132/82 mmHg, heart rate stable  ABD: Liver spleen normal, good bowel sounds, nontender  : Deferred  NEURO: Intact  PSYCH: Normal  EXTREM: No significant arthritic changes, no edema  SKIN: Clear     Results Review:        ECG 12 Lead    Date/Time: 8/28/2023 6:20 PM  Performed by: Ronnie Mas MD  Authorized by: Ronnie Mas MD   Comparison: compared with previous ECG from 8/24/2022  Similar to previous ECG  Comparison to previous ECG: Current EKG is sinus rhythm normal similar to EKG of August 24, 2022  Rhythm: sinus rhythm  Rate: normal  Conduction: conduction normal  ST Segments: ST segments normal  T Waves: T waves normal  QRS axis: normal  Other: no other findings    Clinical impression: normal ECG  Comments: Current EKG is sinus rhythm normal with no ischemia similar to EKG of August 24, 2022    Patient Wellness Counseling:   Plan of care reviewed with patient at the conclusion of today's visit. Education was provided in regards to diagnosis, diet and exercise, cervical cancer screening, self breast exams, breast cancer screening, and the importance of yearly mammograms.   Nutrition, family planning/contraception, physical activity, healthy weight,ways to reduce stress, adequate sleep, injury prevention, misuse of tobacco, alcohol and drugs, sexual behavior and STD's, dental health, mental health, and immunizations.    Management and any prescribed or recommended OTC medications.  Patient verbalizes understanding of and agreement with management plan.    Medication Review: Medications reviewed and noted    Patient wellness counseling  Exercise: Encouraged continued walking exercise and ADL active lifestyle  Diet: Encouraged healthy cardiac diet and weight loss  Screening: Fasting lab of CBC CMP  lipid TSH pending  Social History     Socioeconomic History    Marital status:    Tobacco Use    Smoking status: Never    Smokeless tobacco: Never   Vaping Use    Vaping Use: Never used   Substance and Sexual Activity    Alcohol use: No    Drug use: No    Sexual activity: Yes     Partners: Male     Birth control/protection: Surgical, Post-menopausal        Assessment/Plan:  Problem List Items Addressed This Visit          Cardiac and Vasculature    Mixed hyperlipidemia    Overview     History of mixed hyperlipidemia on atorvastatin 40 mg daily denies myalgia arthralgia         Current Assessment & Plan       Mixed hyperlipidemia on atorvastatin 40 mg daily denies myalgia arthralgia fasting CMP and lipid are pending continue therapy and encouraged low-cholesterol healthy cardiac diet         Relevant Medications    atorvastatin (LIPITOR) 40 MG tablet    Other Relevant Orders    CBC & Differential    Comprehensive Metabolic Panel    Lipid Panel    TSH    Benign essential hypertension    Overview     History of essential hypertension on carvedilol 6.25 twice daily         Current Assessment & Plan       Mild essential hypertension with current blood pressure 132/82 heart rate of 60 on carvedilol 6.25 mg twice daily continue therapy EKG is normal CMP is pending         Relevant Medications    carvedilol (COREG) 6.25 MG tablet    Other Relevant Orders    ECG 12 Lead    Raynaud's syndrome    Overview     Patient is mild Raynaud's of toes and hands with exposure to cold and discoloration with minor discomfort she has not a smoker and does not use caffeine or nicotine         Current Assessment & Plan     Cold avoidance            Endocrine and Metabolic    Vitamin D deficiency    Overview     History of vitamin D deficiency currently on vitamin D3 1000 units daily         Current Assessment & Plan     Previously low vitamin D level currently on 1000 units/day continue therapy         Relevant Orders    CBC &  Differential    Comprehensive Metabolic Panel    Lipid Panel    TSH       Gastrointestinal Abdominal     Gastroesophageal reflux disease without esophagitis    Overview     GERD reflux on famotidine 40 mg daily         Current Assessment & Plan     GERD reflux on famotidine 40 mg daily denies nausea vomiting or abdominal pain continue current therapy         Relevant Medications    famotidine (PEPCID) 40 MG tablet    Other Relevant Orders    CBC & Differential    Comprehensive Metabolic Panel    Lipid Panel    TSH       Genitourinary and Reproductive     Neurogenic bladder    Overview     Patient has neurogenic bladder requiring self catheter catheterization 3-4 times per 24 hours no recent infections stable.         Current Assessment & Plan     Neurogenic bladder since spinal surgery for release of tethered spinal cord she has had recent bladder surgery for prolapse and her urethra has lost some of its control and she is now having to self cath approximately 6-7 times per day increased from 3-4 because of urinary leakage she does have planned surgical procedure on the urethra in the near future with urology GYN specialist at          Relevant Orders    CBC & Differential    Comprehensive Metabolic Panel    Lipid Panel    TSH       Health Encounters    Preventative health care - Primary    Overview     Patient 58-year-old female presents for annual preventative visit and physical examination on August 28, 2023         Relevant Orders    CBC & Differential    Comprehensive Metabolic Panel    Lipid Panel    TSH       Neuro    Tethered spinal cord    Overview     Symptomatic urinary retention treated with tethered spinal cord release by Dr. Casey Clark 2004 with current self cath 3-4 times per day for urinary retention         Current Assessment & Plan     Tethered spinal cord release surgery by Dr. Casey Clark 2004 with current self cath previously 3-4 times per day but since her vaginal reconstruction has  had increased frequency and some urinary incontinence with procedure planned for the fall 2023 for injections around the urethral by the  urology gynecologist         Relevant Orders    CBC & Differential    Comprehensive Metabolic Panel    Lipid Panel    TSH        Patient Instructions   Fasting lab of CBC CMP lipid TSH pending  EKG discussed no ischemia  Encouraged follow-up with   GYN surgery for upcoming procedure on patient's urethra  Discussed with surgeon urinary catheter requirement changes after the surgery  Continue current medications  Continue cardiac healthy diet reduce carbs and weight loss  Continue exercise ADL  Return visit in 6 months or as needed     CMP:  Lab Results   Component Value Date    BUN 13 02/27/2023    CREATININE 0.83 02/27/2023    EGFRIFNONA 73 08/19/2021    BCR 15.7 02/27/2023     02/27/2023    K 4.6 02/27/2023    CO2 28.0 02/27/2023    CALCIUM 9.9 02/27/2023    ALBUMIN 4.4 02/27/2023    BILITOT 0.4 02/27/2023    ALKPHOS 88 02/27/2023    AST 13 02/27/2023    ALT 12 02/27/2023     HbA1c:  Lab Results   Component Value Date    HGBA1C 5.70 (H) 07/30/2019     Microalbumin:  No results found for: MICROALBUR, POCMALB, POCCREAT  Lipid Panel  Lab Results   Component Value Date    CHOL 181 02/27/2023    TRIG 210 (H) 02/27/2023    HDL 55 02/27/2023    LDL 91 02/27/2023    AST 13 02/27/2023    ALT 12 02/27/2023        Counseling was given to patient for the following topics: disease prevention.    Plan of care reviewed with patient at the conclusion of today's visit. Education was provided regarding diagnosis, management, and any prescribed or recommended OTC medications.Patient verbalizes understanding of and agreement with management plan.         Ronnie Mas MD    Note: Part of this note may be an electronic transcription/translation of spoken language to printed text using Dragon Dictation System.      Transcribed from ambient dictation for Ronnie Mas MD by Crystal  Major.  08/28/23   11:52 EDT    Patient or patient representative verbalized consent to the visit recording.  I have personally performed the services described in this document as transcribed by the above individual, and it is both accurate and complete.

## 2023-08-28 NOTE — ASSESSMENT & PLAN NOTE
Mild essential hypertension with current blood pressure 132/82 heart rate of 60 on carvedilol 6.25 mg twice daily continue therapy EKG is normal CMP is pending

## 2023-08-28 NOTE — ASSESSMENT & PLAN NOTE
Mixed hyperlipidemia on atorvastatin 40 mg daily denies myalgia arthralgia fasting CMP and lipid are pending continue therapy and encouraged low-cholesterol healthy cardiac diet

## 2023-08-28 NOTE — ASSESSMENT & PLAN NOTE
GERD reflux on famotidine 40 mg daily denies nausea vomiting or abdominal pain continue current therapy

## 2023-08-28 NOTE — ASSESSMENT & PLAN NOTE
Neurogenic bladder since spinal surgery for release of tethered spinal cord she has had recent bladder surgery for prolapse and her urethra has lost some of its control and she is now having to self cath approximately 6-7 times per day increased from 3-4 because of urinary leakage she does have planned surgical procedure on the urethra in the near future with urology GYN specialist at

## 2023-08-28 NOTE — PATIENT INSTRUCTIONS
Fasting lab of CBC CMP lipid TSH pending  EKG discussed no ischemia  Encouraged follow-up with [facility]  GYN surgery for upcoming procedure on patient's urethra  Discussed with surgeon urinary catheter requirement changes after the surgery  Continue current medications  Continue cardiac healthy diet reduce carbs and weight loss  Continue exercise ADL  Return visit in 6 months or as needed

## 2023-08-29 LAB
ALBUMIN SERPL-MCNC: 4.4 G/DL (ref 3.5–5.2)
ALBUMIN/GLOB SERPL: 1.4 G/DL
ALP SERPL-CCNC: 82 U/L (ref 39–117)
ALT SERPL W P-5'-P-CCNC: 15 U/L (ref 1–33)
ANION GAP SERPL CALCULATED.3IONS-SCNC: 9.9 MMOL/L (ref 5–15)
AST SERPL-CCNC: 17 U/L (ref 1–32)
BILIRUB SERPL-MCNC: 0.3 MG/DL (ref 0–1.2)
BUN SERPL-MCNC: 11 MG/DL (ref 6–20)
BUN/CREAT SERPL: 14.5 (ref 7–25)
CALCIUM SPEC-SCNC: 9.7 MG/DL (ref 8.6–10.5)
CHLORIDE SERPL-SCNC: 104 MMOL/L (ref 98–107)
CHOLEST SERPL-MCNC: 195 MG/DL (ref 0–200)
CO2 SERPL-SCNC: 25.1 MMOL/L (ref 22–29)
CREAT SERPL-MCNC: 0.76 MG/DL (ref 0.57–1)
EGFRCR SERPLBLD CKD-EPI 2021: 91.5 ML/MIN/1.73
GLOBULIN UR ELPH-MCNC: 3.1 GM/DL
GLUCOSE SERPL-MCNC: 77 MG/DL (ref 65–99)
HDLC SERPL-MCNC: 50 MG/DL (ref 40–60)
LDLC SERPL CALC-MCNC: 89 MG/DL (ref 0–100)
LDLC/HDLC SERPL: 1.54 {RATIO}
POTASSIUM SERPL-SCNC: 4.7 MMOL/L (ref 3.5–5.2)
PROT SERPL-MCNC: 7.5 G/DL (ref 6–8.5)
SODIUM SERPL-SCNC: 139 MMOL/L (ref 136–145)
TRIGL SERPL-MCNC: 339 MG/DL (ref 0–150)
TSH SERPL DL<=0.05 MIU/L-ACNC: 2.57 UIU/ML (ref 0.27–4.2)
VLDLC SERPL-MCNC: 56 MG/DL (ref 5–40)

## 2023-10-12 ENCOUNTER — TELEPHONE (OUTPATIENT)
Dept: INTERNAL MEDICINE | Facility: CLINIC | Age: 58
End: 2023-10-12
Payer: COMMERCIAL

## 2023-10-12 NOTE — TELEPHONE ENCOUNTER
"I spoke with patient.  She is having a procedure next week called \"Bulk-a-med\"; it is injections around the urethra to prevent urine leakage.   She needs an order for a smaller sized catheter - #12FR.  This order is written and faxed to [facility] at [phone].   "

## 2023-10-12 NOTE — TELEPHONE ENCOUNTER
Caller: SammiePadmini Skinner    Relationship: Self    Best call back number: [phone]    What is the best time to reach you: ANYTIME     Who are you requesting to speak with (clinical staff, provider,  specific staff member): CLINICAL STAFF     What was the call regarding: PATIENT IS ASKING FOR A CALL BACK FROM MARCO A. SHE SAYS THAT SHE HAS SOME QUESTIONS ABOUT CATHETERS AND A PROCEDURE THAT SHE IS HAVING DONE NEXT WEEK.     Is it okay if the provider responds through MyChart: NO

## 2023-10-16 ENCOUNTER — TELEPHONE (OUTPATIENT)
Dept: INTERNAL MEDICINE | Facility: CLINIC | Age: 58
End: 2023-10-16

## 2023-10-16 ENCOUNTER — TELEPHONE (OUTPATIENT)
Dept: INTERNAL MEDICINE | Facility: CLINIC | Age: 58
End: 2023-10-16
Payer: COMMERCIAL

## 2023-10-16 NOTE — TELEPHONE ENCOUNTER
PT IS HAVING SURGERY TMW 10/17/2023 AND NEEDS A CATHETER FOR WEDNESDAY. [facility] NEEDED HER NOTES FOR THEIR OFFICE TO BE ABLE TO ORDER THE CATH FOR HER.     CHUY WHATLEY  PHONE; [phone]  FAX: [phone]  [facility]     5:09 THURSDAY   FORM WAS FAXED AND THE DOC NEEDS TO SIGN FOR THEM TO BE ABLE TO ORDER HER CATH.

## 2023-10-16 NOTE — TELEPHONE ENCOUNTER
Caller: Padmini Cochran Skinner    Relationship: Self    Best call back number: [phone]    Who are you requesting to speak with (clinical staff, provider,  specific staff member): MARCO A    What was the call regarding: PATIENT IS ASKING TO SPEAK TO MARCO A ABOUT CATHETER PRESCRIPTION

## 2023-10-16 NOTE — TELEPHONE ENCOUNTER
I spoke with patient.  Dr. Mas completed the requested form for [facility] and it was faxed with office note of 8/28/23

## 2023-10-24 ENCOUNTER — OFFICE VISIT (OUTPATIENT)
Dept: INTERNAL MEDICINE | Facility: CLINIC | Age: 58
End: 2023-10-24
Payer: COMMERCIAL

## 2023-10-24 VITALS
HEIGHT: 63 IN | SYSTOLIC BLOOD PRESSURE: 154 MMHG | WEIGHT: 157 LBS | HEART RATE: 80 BPM | BODY MASS INDEX: 27.82 KG/M2 | DIASTOLIC BLOOD PRESSURE: 98 MMHG

## 2023-10-24 DIAGNOSIS — E78.2 MIXED HYPERLIPIDEMIA: ICD-10-CM

## 2023-10-24 DIAGNOSIS — K21.00 GASTROESOPHAGEAL REFLUX DISEASE WITH ESOPHAGITIS WITHOUT HEMORRHAGE: ICD-10-CM

## 2023-10-24 DIAGNOSIS — N31.9 NEUROGENIC BLADDER: ICD-10-CM

## 2023-10-24 DIAGNOSIS — H83.03 LABYRINTHITIS OF BOTH EARS: ICD-10-CM

## 2023-10-24 DIAGNOSIS — I10 BENIGN ESSENTIAL HYPERTENSION: Primary | ICD-10-CM

## 2023-10-24 PROCEDURE — 99214 OFFICE O/P EST MOD 30 MIN: CPT | Performed by: INTERNAL MEDICINE

## 2023-10-24 RX ORDER — DEXAMETHASONE 2 MG/1
2 TABLET ORAL 2 TIMES DAILY WITH MEALS
Qty: 3 TABLET | Refills: 1 | Status: SHIPPED | OUTPATIENT
Start: 2023-10-24

## 2023-10-24 RX ORDER — MECLIZINE HYDROCHLORIDE 25 MG/1
25 TABLET ORAL 3 TIMES DAILY PRN
Qty: 25 TABLET | Refills: 1 | Status: SHIPPED | OUTPATIENT
Start: 2023-10-24

## 2023-10-24 RX ORDER — SULFAMETHOXAZOLE AND TRIMETHOPRIM 800; 160 MG/1; MG/1
1 TABLET ORAL EVERY 12 HOURS SCHEDULED
COMMUNITY
Start: 2023-10-16

## 2023-10-24 NOTE — PATIENT INSTRUCTIONS
Decadron 2 mg every 12 hours x 3 doses  Meclizine 25 mg 3 times daily  Continue all other medications  Let us know if not improved  Return visit as scheduled for February 28

## 2023-10-24 NOTE — PROGRESS NOTES
"[facility]     Padmini Cochran  1965   [id number]      Patient Care Team:  Ronnie Mas MD as PCP - General (Internal Medicine)  Gorge, LEO Forte as Nurse Practitioner (Obstetrics and Gynecology)    Chief Complaint::   Chief Complaint   Patient presents with    s/p urinary procedure     Injections in urethra to \"bulk it up\" to decrease urinary leakage.  Patient states improvement.     Dizziness     X 3 days.  Intermittent.  Worse when turning her head.              HPI  The patient is a 58-year-old female who presents for an office visit for neurogenic bladder and is getting procedures of injection of Bulkamid.    The patient states that the Bulkamid is helping tremendously. She has had 1 injection. She states that it has reduced the leaking.    The patient states that she has been experiencing dizziness since 10/22/2023. She states that she got up out of bed and was dizzy as soon as she rolled over. She states that she got up to go to the bathroom and was doing something in the cabinet. She states that there was a mirror beside the length of mirror and she saw the mirror out of the corner of her eye and it looked like it was moving like it was falling on her. She states that she hit her head on something, but she ended up in the floor. She states that her  got her up and puts her back in the bed and she lays there for a little while. She states that she was fully aware of all the events. She states that she did not pass out. She states that within a couple of hours, it wore off. She states that on 10/23/2023, she got up and it was a lot worse. She states that she is still a little bit today with the dizziness. She states that when she turns her head, it makes it worse. She states that she has a little bit of nausea and a little bit of a headache. She states that she can not get her balance. She states that when she is walking through the floor, she has to hold onto stuff " or her . She states that she feels like the room is spinning. She states that she has a slight discomfort. She denies any double vision or blurred vision. She denies any ear pain. She denies any full or pressure. She denies any trouble swallowing. She states that her daughter is a nurse and she told her to try Flonase and Allegra. She states that she took Allegra last night and it helped a little. She states that she is worse when she is standing. She states that she did not get out of the recliner much yesterday. She states that it is a terrible feeling. She states that it makes her fearful of falling. She states that she has taken her carvedilol today.        Patient Active Problem List   Diagnosis    Urinary retention    Tethered spinal cord    Neurogenic bladder    GERD (gastroesophageal reflux disease)    Depression    Irritable bowel syndrome with diarrhea    Fibrocystic breast changes, bilateral    Colon polyp    Mixed hyperlipidemia    Benign essential hypertension    Gastroesophageal reflux disease without esophagitis    Major depressive disorder, recurrent, mild    Anxiety    Esophagitis, reflux    Obesity (BMI 30.0-34.9)    History of spinal cord compression    Vitamin D deficiency    Raynaud's syndrome    Chest pain, unspecified    Family history of diabetes mellitus    Hyperlipidemia    Vaginal enterocele    Carpal tunnel syndrome of right wrist    Preventative health care    Post-menopause on HRT (hormone replacement therapy)    Preventative health care    Palpitations    Preventative health care    COVID-19 virus infection    Preventative health care    Labyrinthitis of both ears        Past Medical History:   Diagnosis Date    Colon polyp     nodular fold sessile serrated polyp    Depression     GERD (gastroesophageal reflux disease)     Hyperlipidemia     Hypertension     Neurogenic bladder     Post-menopause on HRT (hormone replacement therapy)     Tethered spinal cord     Urinary retention         Past Surgical History:   Procedure Laterality Date    COLONOSCOPY  06/2012    Inflammed ICV    DILATATION AND CURETTAGE      ENDOMETRIAL ABLATION      ENDOMETRIAL ABLATION  2005    UTERINE  ABLATION    ENTEROCELE REPAIR      LAPAROSCOPIC HYSTERECTOMY  07/28/2014    LAPAROSCOPIC TUBAL LIGATION      OOPHORECTOMY Bilateral 07/2014    PELVIC LAPAROSCOPY      PERINEOPLASTY      POSTERIOR REPAIR      SPINAL CORD UNTETHERING      SPINE SURGERY  2004    SPINAL CORD SURG    TUBAL ABDOMINAL LIGATION  04/1999       Family History   Problem Relation Age of Onset    Cancer Maternal Grandmother         COLON    Colon cancer Maternal Grandmother     Coronary artery disease Maternal Grandmother     Lymphoma Father     Hypertension Father     Pancreatic cancer Mother     Diabetes Mother     Coronary artery disease Mother         premature; angioplasty 8 09    Hypertension Mother     Alzheimer's disease Paternal Grandmother     Coronary artery disease Maternal Grandfather     Migraines Sister     Breast cancer Neg Hx     Ovarian cancer Neg Hx        Social History     Socioeconomic History    Marital status:    Tobacco Use    Smoking status: Never    Smokeless tobacco: Never   Vaping Use    Vaping Use: Never used   Substance and Sexual Activity    Alcohol use: No    Drug use: No    Sexual activity: Yes     Partners: Male     Birth control/protection: Surgical, Post-menopausal       Allergies   Allergen Reactions    Levaquin [Levofloxacin] Confusion    Hydrochlorothiazide Other (See Comments)     Multiple symptoms; DYAZIDE     Triamterene Other (See Comments)     Multiple sympmtoms; DYAZIDE        Review of Systems     HEENT: Denies any hearing changes, eyesight changes, no headache has fullness in her head and slight vertigo dizziness  NECK: Denies any pain, stiffness, swelling or dysphagia  CHEST: Denies cough or wheeze or recent lung infections  CARDIAC: Denies chest pain, pressure or palpitations  ABD: Denies nausea,  "vomiting or abdominal pain  : Denies dysuria improved incontinence with recent urethral injections  NEURO: Denies syncope, concussion, neuropathy  PSYCH: Denies any stress  EXTREM: Denies arthritic changes myalgia or arthralgia  SKIN: Denies any rash    Vital Signs  Vitals:    10/24/23 1453   BP: 154/98   BP Location: Right arm   Patient Position: Sitting   Cuff Size: Adult   Pulse: 80   Weight: 71.2 kg (157 lb)   Height: 160 cm (63\")   PainSc: 0-No pain     Body mass index is 27.81 kg/m².        Advance Care Planning         Current Outpatient Medications:     acyclovir (ZOVIRAX) 400 MG tablet, TAKE 1 TABLET BY MOUTH THREE TIMES DAILY. NO MORE THAN 5 DOSES A DAY, Disp: 12 tablet, Rfl: 1    atorvastatin (LIPITOR) 40 MG tablet, TAKE 1 TABLET DAILY        (INCREASED DOSE), Disp: 90 tablet, Rfl: 3    buPROPion (WELLBUTRIN) 75 MG tablet, TAKE 2 TABLETS DAILY, Disp: 180 tablet, Rfl: 3    calcium carbonate (OS-ANNITA) 600 MG tablet, Take 1 tablet by mouth Daily., Disp: , Rfl:     carvedilol (COREG) 6.25 MG tablet, TAKE 1 TABLET 2 TIMES DAILYWITH FOOD, Disp: 180 tablet, Rfl: 3    cholecalciferol (VITAMIN D3) 1000 UNITS tablet, Take 1 tablet by mouth Daily., Disp: , Rfl:     cycloSPORINE (RESTASIS) 0.05 % ophthalmic emulsion, Apply 1 drop to eye(s) as directed by provider 2 (Two) Times a Day. Both eyes, Disp: , Rfl:     diazePAM (VALIUM) 5 MG tablet, Take 1 tablet by mouth At Night As Needed for Anxiety., Disp: 30 tablet, Rfl: 2    estradiol (ESTRACE) 0.1 MG/GM vaginal cream, Insert 2 g into the vagina 2 (Two) Times a Week., Disp: , Rfl:     estradiol (Estrace) 1 MG tablet, Take 1 tablet by mouth Daily., Disp: 90 tablet, Rfl: 3    famotidine (PEPCID) 40 MG tablet, TAKE 1 TABLET DAILY, Disp: 90 tablet, Rfl: 3    fluorometholone (FML) 0.1 % ophthalmic suspension, , Disp: , Rfl:     sulfamethoxazole-trimethoprim (BACTRIM DS,SEPTRA DS) 800-160 MG per tablet, Take 1 tablet by mouth Every 12 (Twelve) Hours., Disp: , Rfl:     " dexAMETHasone (DECADRON) 2 MG tablet, Take 1 tablet by mouth 2 (Two) Times a Day With Meals., Disp: 3 tablet, Rfl: 1    meclizine (ANTIVERT) 25 MG tablet, Take 1 tablet by mouth 3 (Three) Times a Day As Needed for Dizziness., Disp: 25 tablet, Rfl: 1    Physical Exam     ACE III MINI         HEENT: Pupils equal reactive ENT clear, no facial asymmetry, the pharynx is clear no nystagmus TMs are clear no temporal artery discomfort soreness and no sinus pressure frontal ethmoid or maxillary  NECK: No masses bruit or thyromegaly  CHEST: Clear without rales wheezes or rhonchi  CARDIAC: Regular rhythm without gallop rub or click, blood pressure 140/82 mmHg, heart rate stable  ABD: Liver spleen normal, good bowel sounds, nontender  : Deferred  NEURO: Intact  PSYCH: Normal  EXTREM: No significant arthritic changes, no edema  SKIN: Clear     Results Review:    Recent Results (from the past 672 hour(s))   CBC (NO DIFF)    Collection Time: 10/12/23  4:23 PM    Specimen: Blood, Venous Line   Result Value Ref Range    WBC 6.38 3.70 - 10.30 10*3/uL    RBC 4.43 3.90 - 5.20 10*6/uL    Hemoglobin 13.2 11.2 - 15.7 g/dL    Hematocrit 40.6 34.0 - 45.0 %    Platelets 344 155 - 369 10*3/uL    MCV 92 79 - 98 fL    MCH 29.8 26.0 - 32.0 pg    MCHC 32.5 30.7 - 35.5 g/dL    RDW 11.7 11.5 - 14.5 %    MPV 9.0 8.8 - 12.5 fL    nRBC 0.0 <=0.0 per 100 WBCs     Procedures    Medication Review: Medications reviewed and noted    Patient wellness counseling  Exercise: Encouraged rest while experiencing the labyrinthitis  Diet: Healthy cardiac diet  Screening:  Social History     Socioeconomic History    Marital status:    Tobacco Use    Smoking status: Never    Smokeless tobacco: Never   Vaping Use    Vaping Use: Never used   Substance and Sexual Activity    Alcohol use: No    Drug use: No    Sexual activity: Yes     Partners: Male     Birth control/protection: Surgical, Post-menopausal        Assessment/Plan:    Problem List Items Addressed  This Visit          Cardiac and Vasculature    Mixed hyperlipidemia    Overview     History of mixed hyperlipidemia on atorvastatin 40 mg daily denies myalgia arthralgia         Current Assessment & Plan       Mixed hyperlipidemia on atorvastatin 40 mg daily continue therapy         Relevant Medications    atorvastatin (LIPITOR) 40 MG tablet    Benign essential hypertension - Primary    Overview     History of essential hypertension on carvedilol 6.25 twice daily         Current Assessment & Plan       Essential hypertension on carvedilol 6.25 mg twice daily continue therapy         Relevant Medications    carvedilol (COREG) 6.25 MG tablet       ENT    Labyrinthitis of both ears    Overview     Patient complains of dizziness fullness in the ears worse with head rotation she has fallen and hit her head did not lose consciousness the area on her posterior scalp is less tender and is no longer swelling she denies visual changes denies significant hearing loss or tenderness but does complain of fullness congestion         Current Assessment & Plan     Please the patient has labyrinthitis and eustachian tube dysfunction we will treat with Decadron 2 mg every 12 hours x 3 doses and meclizine 25 mg 3 times daily if not improved to notify us            Gastrointestinal Abdominal     GERD (gastroesophageal reflux disease)    Overview     History of GERD gastritis previously on omeprazole 20 mg daily which is effective in his therapy however insurance is having difficulty covering the cost of the medication.  Will change to H2 blocker famotidine 40 mg daily         Current Assessment & Plan     GERD gastritis controlled with Pepcid 40 mg daily continue therapy         Relevant Medications    famotidine (PEPCID) 40 MG tablet       Genitourinary and Reproductive     Neurogenic bladder    Overview     Patient has neurogenic bladder requiring self catheter catheterization 3-4 times per 24 hours no recent infections stable.          Current Assessment & Plan     Neurogenic bladder with self cath multiple times per day the patient has recently had significant problems with urinary stress incontinence and has been receiving injections in the urethra to build up tissue which has been very effective and she is only having minimal problems with the stress incontinence at this time.  Continue follow-up with urology             Patient Instructions   Decadron 2 mg every 12 hours x 3 doses  Meclizine 25 mg 3 times daily  Continue all other medications  Let us know if not improved  Return visit as scheduled for February 28     Plan of care reviewed with patient at the conclusion of today's visit. Education was provided regarding diagnosis, management, and any prescribed or recommended OTC medications.Patient verbalizes understanding of and agreement with management plan.         Ronnie Mas MD      Note: Part of this note may be an electronic transcription/translation of spoken language to printed text using the Dragon Dictation system.    Transcribed from ambient dictation for Ronnie Mas MD by Howard Stanley.  10/24/23   15:51 EDT    Patient or patient representative verbalized consent to the visit recording.  I have personally performed the services described in this document as transcribed by the above individual, and it is both accurate and complete.

## 2023-10-25 NOTE — ASSESSMENT & PLAN NOTE
Please the patient has labyrinthitis and eustachian tube dysfunction we will treat with Decadron 2 mg every 12 hours x 3 doses and meclizine 25 mg 3 times daily if not improved to notify us

## 2023-10-25 NOTE — ASSESSMENT & PLAN NOTE
Neurogenic bladder with self cath multiple times per day the patient has recently had significant problems with urinary stress incontinence and has been receiving injections in the urethra to build up tissue which has been very effective and she is only having minimal problems with the stress incontinence at this time.  Continue follow-up with urology

## 2023-11-02 ENCOUNTER — PATIENT MESSAGE (OUTPATIENT)
Dept: INTERNAL MEDICINE | Facility: CLINIC | Age: 58
End: 2023-11-02
Payer: COMMERCIAL

## 2023-11-02 RX ORDER — NEOMYCIN SULFATE, POLYMYXIN B SULFATE AND HYDROCORTISONE 10; 3.5; 1 MG/ML; MG/ML; [USP'U]/ML
3 SUSPENSION/ DROPS AURICULAR (OTIC) 3 TIMES DAILY
Qty: 10 ML | Refills: 1 | Status: SHIPPED | OUTPATIENT
Start: 2023-11-02

## 2023-11-02 NOTE — TELEPHONE ENCOUNTER
Continue the meclizine and is sent Cortisporin otic 3 drops right ear 3 times a day to the patient's drugstore please inform

## 2023-11-15 RX ORDER — MECLIZINE HYDROCHLORIDE 25 MG/1
25 TABLET ORAL 3 TIMES DAILY PRN
Qty: 25 TABLET | Refills: 1 | OUTPATIENT
Start: 2023-11-15

## 2023-11-15 RX ORDER — MECLIZINE HYDROCHLORIDE 25 MG/1
25 TABLET ORAL 3 TIMES DAILY PRN
Qty: 25 TABLET | Refills: 1 | Status: SHIPPED | OUTPATIENT
Start: 2023-11-15

## 2023-11-15 NOTE — TELEPHONE ENCOUNTER
Rx Refill Note  Requested Prescriptions     Pending Prescriptions Disp Refills    meclizine (ANTIVERT) 25 MG tablet [Pharmacy Med Name: MECLIZINE 25MG RX TABLETS] 25 tablet 1     Sig: TAKE 1 TABLET BY MOUTH THREE TIMES DAILY AS NEEDED FOR DIZZINESS      Last office visit with prescribing clinician: 10/24/2023   Last telemedicine visit with prescribing clinician: Visit date not found   Next office visit with prescribing clinician: 2/28/2024                         Would you like a call back once the refill request has been completed: [] Yes [] No    If the office needs to give you a call back, can they leave a voicemail: [] Yes [] No    Cindy Castillo RN  11/15/23, 11:30 EST

## 2023-12-01 RX ORDER — FAMOTIDINE 40 MG/1
TABLET, FILM COATED ORAL
Qty: 90 TABLET | Refills: 3 | Status: SHIPPED | OUTPATIENT
Start: 2023-12-01

## 2023-12-26 RX ORDER — BUPROPION HYDROCHLORIDE 75 MG/1
TABLET ORAL
Qty: 180 TABLET | Refills: 3 | Status: SHIPPED | OUTPATIENT
Start: 2023-12-26

## 2024-02-26 RX ORDER — CARVEDILOL 6.25 MG/1
TABLET ORAL
Qty: 180 TABLET | Refills: 3 | Status: SHIPPED | OUTPATIENT
Start: 2024-02-26

## 2024-02-26 RX ORDER — ATORVASTATIN CALCIUM 40 MG/1
TABLET, FILM COATED ORAL
Qty: 90 TABLET | Refills: 3 | Status: SHIPPED | OUTPATIENT
Start: 2024-02-26

## 2024-02-28 ENCOUNTER — OFFICE VISIT (OUTPATIENT)
Dept: INTERNAL MEDICINE | Facility: CLINIC | Age: 59
End: 2024-02-28
Payer: COMMERCIAL

## 2024-02-28 ENCOUNTER — LAB (OUTPATIENT)
Dept: LAB | Facility: HOSPITAL | Age: 59
End: 2024-02-28
Payer: COMMERCIAL

## 2024-02-28 VITALS
BODY MASS INDEX: 29.06 KG/M2 | SYSTOLIC BLOOD PRESSURE: 134 MMHG | HEART RATE: 64 BPM | HEIGHT: 63 IN | DIASTOLIC BLOOD PRESSURE: 84 MMHG | WEIGHT: 164 LBS | OXYGEN SATURATION: 96 %

## 2024-02-28 DIAGNOSIS — Q06.8 TETHERED SPINAL CORD: ICD-10-CM

## 2024-02-28 DIAGNOSIS — I10 BENIGN ESSENTIAL HYPERTENSION: Primary | ICD-10-CM

## 2024-02-28 DIAGNOSIS — N31.9 NEUROGENIC BLADDER: ICD-10-CM

## 2024-02-28 DIAGNOSIS — E78.2 MIXED HYPERLIPIDEMIA: ICD-10-CM

## 2024-02-28 DIAGNOSIS — K21.9 GASTROESOPHAGEAL REFLUX DISEASE WITHOUT ESOPHAGITIS: ICD-10-CM

## 2024-02-28 DIAGNOSIS — F41.9 ANXIETY: ICD-10-CM

## 2024-02-28 DIAGNOSIS — I10 BENIGN ESSENTIAL HYPERTENSION: ICD-10-CM

## 2024-02-28 LAB
ALBUMIN SERPL-MCNC: 4.3 G/DL (ref 3.5–5.2)
ALBUMIN/GLOB SERPL: 1.6 G/DL
ALP SERPL-CCNC: 95 U/L (ref 39–117)
ALT SERPL W P-5'-P-CCNC: 15 U/L (ref 1–33)
ANION GAP SERPL CALCULATED.3IONS-SCNC: 8 MMOL/L (ref 5–15)
AST SERPL-CCNC: 15 U/L (ref 1–32)
BILIRUB SERPL-MCNC: 0.4 MG/DL (ref 0–1.2)
BUN SERPL-MCNC: 16 MG/DL (ref 6–20)
BUN/CREAT SERPL: 17 (ref 7–25)
CALCIUM SPEC-SCNC: 9.9 MG/DL (ref 8.6–10.5)
CHLORIDE SERPL-SCNC: 105 MMOL/L (ref 98–107)
CHOLEST SERPL-MCNC: 177 MG/DL (ref 0–200)
CO2 SERPL-SCNC: 27 MMOL/L (ref 22–29)
CREAT SERPL-MCNC: 0.94 MG/DL (ref 0.57–1)
EGFRCR SERPLBLD CKD-EPI 2021: 70.9 ML/MIN/1.73
GLOBULIN UR ELPH-MCNC: 2.7 GM/DL
GLUCOSE SERPL-MCNC: 87 MG/DL (ref 65–99)
HDLC SERPL-MCNC: 47 MG/DL (ref 40–60)
LDLC SERPL CALC-MCNC: 98 MG/DL (ref 0–100)
LDLC/HDLC SERPL: 1.96 {RATIO}
POTASSIUM SERPL-SCNC: 4.4 MMOL/L (ref 3.5–5.2)
PROT SERPL-MCNC: 7 G/DL (ref 6–8.5)
SODIUM SERPL-SCNC: 140 MMOL/L (ref 136–145)
TRIGL SERPL-MCNC: 189 MG/DL (ref 0–150)
VLDLC SERPL-MCNC: 32 MG/DL (ref 5–40)

## 2024-02-28 PROCEDURE — 80061 LIPID PANEL: CPT

## 2024-02-28 PROCEDURE — 80053 COMPREHEN METABOLIC PANEL: CPT

## 2024-02-28 PROCEDURE — 99214 OFFICE O/P EST MOD 30 MIN: CPT | Performed by: INTERNAL MEDICINE

## 2024-02-28 NOTE — ASSESSMENT & PLAN NOTE
Mild stress anxiety on Valium 5 mg at at bedtime as needed and bupropion 150 mg daily overall doing well continue therapy

## 2024-02-28 NOTE — ASSESSMENT & PLAN NOTE
Urinary retention with tethered spinal cord release by Dr. Casey Clark 2004 with self cath and overall doing well followed by urology it was done a urethral procedure helping with incontinence the procedure was called Bulkamid and was beneficial for approximately 2 to 3 months and controlling her incontinence the urologist would like to repeat

## 2024-02-28 NOTE — PROGRESS NOTES
[facility]     Padmini Cochran  1965   [id number]      Patient Care Team:  Ronnie Mas MD as PCP - General (Internal Medicine)  Gorge, LEO Forte as Nurse Practitioner (Obstetrics and Gynecology)    Chief Complaint::   Chief Complaint   Patient presents with    Hypertension     6 mo follow up    Hyperlipidemia            HPI    The patient is a 58-year-old female who presents to the office for hypertension and hyperlipidemia.    She denies any headache, dizziness, lightheadedness, or sinus allergy. Her vision and hearing are doing well. She denies any neck trouble, trouble swallowing, coughing, lung congestion or soreness. The atmosphere in the apartment has not caused any allergy, wheezing, or coughing. She denies any chest pain, heart skipping, or palpitations. She denies any muscle soreness or discomfort with the atorvastatin. She is still taking carvedilol and denies any heart racing. She takes bupropion 150 mg every morning, which is helping. She takes Valium as needed at night, possibly once a week. She breaks it in half and takes only half a tablet, which helps. The famotidine for her stomach is doing well.    She has to do a self-catheterization 3 to 5 times a day depending on how much liquid intake she has. It will not completely empty her bladder. Since her last visit, she had a Bulkamid procedure completed on 10/18/2023, which worked great for approximately 2 months, but now it is back to the way it was and she was told that she would have to have it repeated. She has a follow-up appointment in 04/2024. The self-catheterization made it more difficult at the beginning because it was a lot tighter and harder to get through, but the smaller catheter helped. She never could have gotten a bigger one. She was told about a device that will stimulate her bladder.    Her eye doctor told her she has rosacea in her eyes. She has never been to a dermatologist for it. Her face  becomes very red without having makeup on it. She uses Restasis for the dry eyes and rewetting drops. Her eyes feel better with the Restasis.      Patient Active Problem List   Diagnosis    Urinary retention    Tethered spinal cord    Neurogenic bladder    GERD (gastroesophageal reflux disease)    Depression    Irritable bowel syndrome with diarrhea    Fibrocystic breast changes, bilateral    Colon polyp    Mixed hyperlipidemia    Benign essential hypertension    Gastroesophageal reflux disease without esophagitis    Major depressive disorder, recurrent, mild    Anxiety    Esophagitis, reflux    Obesity (BMI 30.0-34.9)    History of spinal cord compression    Vitamin D deficiency    Raynaud's syndrome    Chest pain, unspecified    Family history of diabetes mellitus    Hyperlipidemia    Vaginal enterocele    Carpal tunnel syndrome of right wrist    Preventative health care    Post-menopause on HRT (hormone replacement therapy)    Preventative health care    Palpitations    Preventative health care    COVID-19 virus infection    Preventative health care    Labyrinthitis of both ears        Past Medical History:   Diagnosis Date    Colon polyp     nodular fold sessile serrated polyp    Depression     GERD (gastroesophageal reflux disease)     Hyperlipidemia     Hypertension     Neurogenic bladder     Post-menopause on HRT (hormone replacement therapy)     Tethered spinal cord     Urinary retention        Past Surgical History:   Procedure Laterality Date    COLONOSCOPY  06/2012    Inflammed ICV    DILATATION AND CURETTAGE      ENDOMETRIAL ABLATION      ENDOMETRIAL ABLATION  2005    UTERINE  ABLATION    ENTEROCELE REPAIR      LAPAROSCOPIC HYSTERECTOMY  07/28/2014    LAPAROSCOPIC TUBAL LIGATION      OOPHORECTOMY Bilateral 07/2014    PELVIC LAPAROSCOPY      PERINEOPLASTY      POSTERIOR REPAIR      SPINAL CORD UNTETHERING      SPINE SURGERY  2004    SPINAL CORD SURG    TUBAL ABDOMINAL LIGATION  04/1999       Family  History   Problem Relation Age of Onset    Cancer Maternal Grandmother         COLON    Colon cancer Maternal Grandmother     Coronary artery disease Maternal Grandmother     Lymphoma Father     Hypertension Father     Pancreatic cancer Mother     Diabetes Mother     Coronary artery disease Mother         premature; angioplasty 8 09    Hypertension Mother     Alzheimer's disease Paternal Grandmother     Coronary artery disease Maternal Grandfather     Migraines Sister     Breast cancer Neg Hx     Ovarian cancer Neg Hx        Social History     Socioeconomic History    Marital status:    Tobacco Use    Smoking status: Never    Smokeless tobacco: Never   Vaping Use    Vaping Use: Never used   Substance and Sexual Activity    Alcohol use: No    Drug use: No    Sexual activity: Yes     Partners: Male     Birth control/protection: Surgical, Post-menopausal       Allergies   Allergen Reactions    Levaquin [Levofloxacin] Confusion    Levamlodipine Unknown - Low Severity    Hydrochlorothiazide Other (See Comments)     Multiple symptoms; DYAZIDE     Triamterene Other (See Comments)     Multiple sympmtoms; DYAZIDE        Review of Systems     HEENT: Denies any hearing changes, eyesight changes, no headache or dizziness   NECK:  Denies any pain, stiffness, swelling or dysphagia   CHEST: Denies cough or wheeze or recent lung infections   CARDIAC: Denies chest pain, pressure or palpitations   ABD: Denies nausea, vomiting or abdominal pain   : Neurogenic bladder with stress incontinence as a result of tethered spinal cord procedure for release in 2004 with self cath 3-4 times per day and urinary incontinence for which she has recently received a Bulkamid procedure which was helpful in controlling urinary incontinence for approximately 2 to 3 months, the urologist would like to proceed with repeat Bulkamid procedure  NEURO:  Denies syncope, concussion, neuropathy  PSYCH: Denies any stress well-controlled with Wellbutrin  "and Valium  EXTREM: Denies arthritic changes myalgia or arthralgia   SKIN: Denies any rash    Vital Signs  Vitals:    02/28/24 1048   BP: 134/84   BP Location: Left arm   Patient Position: Sitting   Cuff Size: Adult   Pulse: 64   SpO2: 96%   Weight: 74.4 kg (164 lb)   Height: 160 cm (63\")   PainSc: 0-No pain     Body mass index is 29.05 kg/m².  BMI is >= 25 and <30. (Overweight) The following options were offered after discussion;: exercise counseling/recommendations and nutrition counseling/recommendations     Advance Care Planning         Current Outpatient Medications:     acyclovir (ZOVIRAX) 400 MG tablet, TAKE 1 TABLET BY MOUTH THREE TIMES DAILY. NO MORE THAN 5 DOSES A DAY, Disp: 12 tablet, Rfl: 1    atorvastatin (LIPITOR) 40 MG tablet, TAKE 1 TABLET DAILY        (INCREASED DOSE), Disp: 90 tablet, Rfl: 3    buPROPion (WELLBUTRIN) 75 MG tablet, TAKE 2 TABLETS DAILY, Disp: 180 tablet, Rfl: 3    calcium carbonate (OS-ANNITA) 600 MG tablet, Take 1 tablet by mouth Daily., Disp: , Rfl:     carvedilol (COREG) 6.25 MG tablet, TAKE 1 TABLET 2 TIMES DAILYWITH FOOD, Disp: 180 tablet, Rfl: 3    cholecalciferol (VITAMIN D3) 1000 UNITS tablet, Take 1 tablet by mouth Daily., Disp: , Rfl:     cycloSPORINE (RESTASIS) 0.05 % ophthalmic emulsion, Apply 1 drop to eye(s) as directed by provider 2 (Two) Times a Day. Both eyes, Disp: , Rfl:     diazePAM (VALIUM) 5 MG tablet, Take 1 tablet by mouth At Night As Needed for Anxiety., Disp: 30 tablet, Rfl: 2    estradiol (ESTRACE) 0.1 MG/GM vaginal cream, Insert 2 applicators into the vagina 2 (Two) Times a Week., Disp: , Rfl:     estradiol (Estrace) 1 MG tablet, Take 1 tablet by mouth Daily., Disp: 90 tablet, Rfl: 3    famotidine (PEPCID) 40 MG tablet, TAKE 1 TABLET DAILY, Disp: 90 tablet, Rfl: 3    Physical Exam     ACE III MINI      EXAM  HEENT: Pupils equal reactive ENT clear, no facial asymmetry, pharynx is clear  NECK:  No masses bruit or thyromegaly  CHEST: Clear without rales wheezes " or rhonchi  CARDIAC: Regular rhythm without gallop rub or click, blood pressure heart rate stable. Heart rate is 64 bpm. Blood pressure is 134/84 mmHg in the the left arm. Oxygen saturation is 96 percent. Blood pressure is 123/78 mmHg in the right arm.   ABD: Liver spleen normal, good bowel sounds, nontender  : Deferred  NEURO: Intact   PSYCH: Normal  EXTREM: No significant arthritic changes, no edema  SKIN: Clear      Results Review:    No results found for this or any previous visit (from the past 672 hour(s)).  Procedures CMP and lipid    Medication Review: Medications reviewed and noted    Patient wellness counseling  Exercise: Encouraged ADL activity and active lifestyle  Diet: Encouraged healthy cardiac diet with reduce carbs and weight loss  Screening: CMP and lipid pending  Social History     Socioeconomic History    Marital status:    Tobacco Use    Smoking status: Never    Smokeless tobacco: Never   Vaping Use    Vaping Use: Never used   Substance and Sexual Activity    Alcohol use: No    Drug use: No    Sexual activity: Yes     Partners: Male     Birth control/protection: Surgical, Post-menopausal        Assessment/Plan:    Problem List Items Addressed This Visit          Cardiac and Vasculature    Mixed hyperlipidemia    Overview     History of mixed hyperlipidemia on atorvastatin 40 mg daily denies myalgia arthralgia         Current Assessment & Plan       Mixed hyperlipidemia on atorvastatin 40 mg daily denies myalgia arthralgia, continue therapy fasting lipid and CMP are pending         Relevant Medications    atorvastatin (LIPITOR) 40 MG tablet    Other Relevant Orders    Comprehensive Metabolic Panel    Lipid Panel    Benign essential hypertension - Primary    Overview     History of essential hypertension on carvedilol 6.25 twice daily         Current Assessment & Plan     Essential hypertension with current blood pressure 134/84 heart rate of 64 O2 saturation 96% on carvedilol 6.25 mg  twice daily continue therapy CMP is pending         Relevant Medications    carvedilol (COREG) 6.25 MG tablet    Other Relevant Orders    Comprehensive Metabolic Panel    Lipid Panel       Gastrointestinal Abdominal     GERD (gastroesophageal reflux disease)    Overview     History of GERD gastritis previously on omeprazole 20 mg daily which is effective in his therapy however insurance is having difficulty covering the cost of the medication.  Will change to H2 blocker famotidine 40 mg daily         Current Assessment & Plan     GERD gastritis on famotidine 40 mg daily continue therapy         Relevant Medications    famotidine (PEPCID) 40 MG tablet       Genitourinary and Reproductive     Neurogenic bladder    Overview     Patient has neurogenic bladder requiring self catheter catheterization 3-4 times per 24 hours no recent infections stable.         Current Assessment & Plan     Neurogenic bladder with tethered spinal cord surgery in 2003 requiring self cath 3-4 times per day and wearing urinary incontinence pads the patient is recently had a urologic procedure with blockage of the proximal ureter that was effective for approximately 2 to 3 months the patient's urologist has suggested repeat procedure as the patient did very well with that procedure which did control her urinary incontinence.  Continue follow-up with urology self cath and potential repeat urethral procedure the Bulkamid procedure            Mental Health    Anxiety    Overview     Mild anxiety treated with Valium 5 mg daily as needed         Current Assessment & Plan     Mild stress anxiety on Valium 5 mg at at bedtime as needed and bupropion 150 mg daily overall doing well continue therapy         Relevant Medications    diazePAM (VALIUM) 5 MG tablet       Neuro    Tethered spinal cord    Overview     Symptomatic urinary retention treated with tethered spinal cord release by Dr. Casey Clark 2004 with current self cath 3-4 times per day for  urinary retention         Current Assessment & Plan     Urinary retention with tethered spinal cord release by Dr. Casey Clark 2004 with self cath and overall doing well followed by urology it was done a urethral procedure helping with incontinence the procedure was called Bulkamid and was beneficial for approximately 2 to 3 months and controlling her incontinence the urologist would like to repeat             Patient Instructions   Fasting CMP and lipid pending  Consider fenofibrate if triglycerides continue to be elevated  Continue current meds  Encourage walking exercise  Encouraged healthy cardiac diet with reduce carbs small portions weight loss  Follow-up with urology  Return visit preventative exam in 6 months or as needed     Plan of care reviewed with patient at the conclusion of today's visit. Education was provided regarding diagnosis, management, and any prescribed or recommended OTC medications.Patient verbalizes understanding of and agreement with management plan.         Ronnie Mas MD      Note: Part of this note may be an electronic transcription/translation of spoken language to printed text using the Dragon Dictation system.        Transcribed from ambient dictation for Ronnie Mas MD by Jazmin Solis.  02/28/24   13:00 EST    Patient or patient representative verbalized consent to the visit recording.  I have personally performed the services described in this document as transcribed by the above individual, and it is both accurate and complete.

## 2024-02-28 NOTE — ASSESSMENT & PLAN NOTE
Mixed hyperlipidemia on atorvastatin 40 mg daily denies myalgia arthralgia, continue therapy fasting lipid and CMP are pending

## 2024-02-28 NOTE — ASSESSMENT & PLAN NOTE
Essential hypertension with current blood pressure 134/84 heart rate of 64 O2 saturation 96% on carvedilol 6.25 mg twice daily continue therapy CMP is pending

## 2024-02-28 NOTE — ASSESSMENT & PLAN NOTE
Neurogenic bladder with tethered spinal cord surgery in 2003 requiring self cath 3-4 times per day and wearing urinary incontinence pads the patient is recently had a urologic procedure with blockage of the proximal ureter that was effective for approximately 2 to 3 months the patient's urologist has suggested repeat procedure as the patient did very well with that procedure which did control her urinary incontinence.  Continue follow-up with urology self cath and potential repeat urethral procedure the Bulkamid procedure

## 2024-02-28 NOTE — PATIENT INSTRUCTIONS
Fasting CMP and lipid pending  Consider fenofibrate if triglycerides continue to be elevated  Continue current meds  Encourage walking exercise  Encouraged healthy cardiac diet with reduce carbs small portions weight loss  Follow-up with urology  Return visit preventative exam in 6 months or as needed

## 2024-04-01 RX ORDER — ACYCLOVIR 400 MG/1
TABLET ORAL
Qty: 12 TABLET | Refills: 1 | Status: SHIPPED | OUTPATIENT
Start: 2024-04-01

## 2024-04-01 RX ORDER — ESTRADIOL 1 MG/1
1 TABLET ORAL DAILY
Qty: 90 TABLET | Refills: 0 | Status: SHIPPED | OUTPATIENT
Start: 2024-04-01

## 2024-04-01 NOTE — TELEPHONE ENCOUNTER
Rx Refill Note  Fax refill request received from the pharmacy.  Appointment with Deloris 5/21/24.  Requested Prescriptions      No prescriptions requested or ordered in this encounter      Last office visit with prescribing clinician: 1/12/2023   Last telemedicine visit with prescribing clinician: Visit date not found   Next office visit with prescribing clinician: 5/21/2024                         Would you like a call back once the refill request has been completed: [] Yes [] No    If the office needs to give you a call back, can they leave a voicemail: [] Yes [] No    Bria Miner MA  04/01/24, 09:12 EDT

## 2024-05-21 ENCOUNTER — OFFICE VISIT (OUTPATIENT)
Dept: OBSTETRICS AND GYNECOLOGY | Facility: CLINIC | Age: 59
End: 2024-05-21
Payer: COMMERCIAL

## 2024-05-21 ENCOUNTER — TRANSCRIBE ORDERS (OUTPATIENT)
Dept: OBSTETRICS AND GYNECOLOGY | Facility: CLINIC | Age: 59
End: 2024-05-21

## 2024-05-21 VITALS
HEIGHT: 63 IN | DIASTOLIC BLOOD PRESSURE: 88 MMHG | SYSTOLIC BLOOD PRESSURE: 150 MMHG | BODY MASS INDEX: 28.6 KG/M2 | WEIGHT: 161.4 LBS

## 2024-05-21 DIAGNOSIS — Z12.31 ENCOUNTER FOR SCREENING MAMMOGRAM FOR BREAST CANCER: Primary | ICD-10-CM

## 2024-05-21 DIAGNOSIS — Z79.890 POSTMENOPAUSAL HORMONE REPLACEMENT THERAPY: ICD-10-CM

## 2024-05-21 DIAGNOSIS — Z01.411 ENCOUNTER FOR GYNECOLOGICAL EXAMINATION WITH ABNORMAL FINDING: Primary | ICD-10-CM

## 2024-05-21 PROCEDURE — 99459 PELVIC EXAMINATION: CPT | Performed by: NURSE PRACTITIONER

## 2024-05-21 PROCEDURE — 99396 PREV VISIT EST AGE 40-64: CPT | Performed by: NURSE PRACTITIONER

## 2024-05-21 RX ORDER — ESTRADIOL 1 MG/1
1 TABLET ORAL DAILY
Qty: 90 TABLET | Refills: 3 | Status: SHIPPED | OUTPATIENT
Start: 2024-05-21

## 2024-05-21 RX ORDER — ESTRADIOL 0.1 MG/G
1 CREAM VAGINAL 2 TIMES WEEKLY
Qty: 42.5 G | Refills: 3 | Status: SHIPPED | OUTPATIENT
Start: 2024-05-23

## 2024-05-21 NOTE — PROGRESS NOTES
Chief Complaint  Padmini Cochran is a 59 y.o.  female presenting for Annual Exam and Med Refill (Estradiol 1 mg (#90 with refills).  [facility] pharmacy.)    History of Present Illness  Padmini is a very pleasant 59-year-old woman, -0-0-2, here for annual GYN exam.  Her distant past surgical history includes bilateral tubal sterilization, endometrial ablation, and a robot assisted TLH/BSO/VVS with posterior repair.  In May 2023.  She had cystocele and rectocele repair with Dr. Rosario at .  She has recuperated very well.  Sexual activity is not painful.  She is now without the pelvic pressure.  She has minimal urinary leaking.  (This was helped greatly by a procedure called Bulkamid.  But, she gradually lost the efficacy over a period of 2 months.)  She is going to reach out to Dr. Rosario to see if she can get the procedure repeated.)  She does still have the neurogenic bladder, and has to do self caths 3-4 times each day.  She has a past history of chronic constipation but that is much better since the surgery.    Mammogram and colonoscopy are up-to-date    The following portions of the patient's history were reviewed and updated as appropriate: allergies, current medications, past family history, past medical history, past social history, past surgical history, and problem list.    Allergies   Allergen Reactions    Levaquin [Levofloxacin] Confusion    Levamlodipine Unknown - Low Severity    Hydrochlorothiazide Other (See Comments)     Multiple symptoms; DYAZIDE     Triamterene Other (See Comments)     Multiple sympmtoms; DYAZIDE          Current Outpatient Medications:     [START ON 2024] estradiol (ESTRACE) 0.1 MG/GM vaginal cream, Insert 1 g into the vagina 2 (Two) Times a Week., Disp: 42.5 g, Rfl: 3    estradiol (Estrace) 1 MG tablet, Take 1 tablet by mouth Daily., Disp: 90 tablet, Rfl: 3    acyclovir (ZOVIRAX) 400 MG tablet, TAKE 1 TABLET BY MOUTH THREE TIMES DAILY. NO MORE THAN 5 DOSES A  "DAY, Disp: 12 tablet, Rfl: 1    atorvastatin (LIPITOR) 40 MG tablet, TAKE 1 TABLET DAILY        (INCREASED DOSE), Disp: 90 tablet, Rfl: 3    buPROPion (WELLBUTRIN) 75 MG tablet, TAKE 2 TABLETS DAILY, Disp: 180 tablet, Rfl: 3    calcium carbonate (OS-ANNITA) 600 MG tablet, Take 1 tablet by mouth Daily., Disp: , Rfl:     carvedilol (COREG) 6.25 MG tablet, TAKE 1 TABLET 2 TIMES DAILYWITH FOOD, Disp: 180 tablet, Rfl: 3    cholecalciferol (VITAMIN D3) 1000 UNITS tablet, Take 1 tablet by mouth Daily., Disp: , Rfl:     cycloSPORINE (RESTASIS) 0.05 % ophthalmic emulsion, Apply 1 drop to eye(s) as directed by provider 2 (Two) Times a Day. Both eyes, Disp: , Rfl:     diazePAM (VALIUM) 5 MG tablet, Take 1 tablet by mouth At Night As Needed for Anxiety., Disp: 30 tablet, Rfl: 2    famotidine (PEPCID) 40 MG tablet, TAKE 1 TABLET DAILY, Disp: 90 tablet, Rfl: 3    Past Medical History:   Diagnosis Date    Colon polyp     nodular fold sessile serrated polyp    Depression     GERD (gastroesophageal reflux disease)     Hyperlipidemia     Hypertension     Neurogenic bladder     Post-menopause on HRT (hormone replacement therapy)     Tethered spinal cord     Urinary retention         Past Surgical History:   Procedure Laterality Date    COLONOSCOPY  06/2012    Inflammed ICV    DILATATION AND CURETTAGE      ENDOMETRIAL ABLATION      ENDOMETRIAL ABLATION  2005    UTERINE  ABLATION    ENTEROCELE REPAIR      LAPAROSCOPIC HYSTERECTOMY  07/28/2014    LAPAROSCOPIC TUBAL LIGATION      OOPHORECTOMY Bilateral 07/2014    PELVIC LAPAROSCOPY      PERINEOPLASTY      POSTERIOR REPAIR      SACROCOLPOPEXY  05/12/2023    [facility], Dr. Rosario    SPINAL CORD UNTETHERING      SPINE SURGERY  2004    SPINAL CORD SURG    TUBAL ABDOMINAL LIGATION  04/1999       Objective  /88   Ht 160 cm (63\")   Wt 73.2 kg (161 lb 6.4 oz)   LMP  (LMP Unknown)   Breastfeeding No   BMI 28.59 kg/m²     Physical Exam  Vitals and nursing note reviewed. Exam conducted with a " chaperone present.   Constitutional:       General: She is not in acute distress.     Appearance: Normal appearance. She is not ill-appearing.   HENT:      Head: Normocephalic.   Neck:      Thyroid: No thyroid mass or thyromegaly.   Cardiovascular:      Rate and Rhythm: Normal rate and regular rhythm.      Heart sounds: Normal heart sounds. No murmur heard.  Pulmonary:      Effort: Pulmonary effort is normal. No respiratory distress.      Breath sounds: Normal breath sounds.   Chest:   Breasts:     Right: No inverted nipple, mass or nipple discharge.      Left: No inverted nipple, mass or nipple discharge.   Abdominal:      Palpations: Abdomen is soft. There is no mass.      Tenderness: There is no abdominal tenderness.   Genitourinary:     General: Normal vulva.      Labia:         Right: No rash, tenderness or lesion.         Left: No rash, tenderness or lesion.       Vagina: No vaginal discharge or erythema.      Uterus: Absent.       Adnexa:         Right: No mass or tenderness.          Left: No mass or tenderness.        Comments: Anus appears wnl.  No rectal exam performed.  Lymphadenopathy:      Upper Body:      Right upper body: No supraclavicular or axillary adenopathy.      Left upper body: No supraclavicular or axillary adenopathy.   Skin:     General: Skin is warm and dry.   Neurological:      Mental Status: She is alert and oriented to person, place, and time.   Psychiatric:         Mood and Affect: Mood normal.         Behavior: Behavior normal.         Assessment/Plan   Diagnoses and all orders for this visit:    1. Encounter for gynecological examination with abnormal finding (Primary)    2. Postmenopausal hormone replacement therapy    Other orders  -     estradiol (ESTRACE) 0.1 MG/GM vaginal cream; Insert 1 g into the vagina 2 (Two) Times a Week.  Dispense: 42.5 g; Refill: 3  -     estradiol (Estrace) 1 MG tablet; Take 1 tablet by mouth Daily.  Dispense: 90 tablet; Refill:  3        Procedures    40 to 64: Counseling/Anticipatory Guidance Discussed: nutrition, physical activity, screenings, self-breast exam, and HRT risks & benefits.  She wishes to cont HRT.    Return in about 1 year (around 5/21/2025) for Annual physical.    Rosemarie Pennington, APRN  05/21/2024

## 2024-05-30 NOTE — ASSESSMENT & PLAN NOTE
Current blood pressure 130/88 left and right sitting continue carvedilol 6.25 twice daily CMP pending   ----- Message from Nicole Barnhart DO sent at 5/29/2024  5:33 PM EDT -----  The x-ray of the knee revealed tricompartmental osteoarthritis along with possible knee effusion affecting the suprapatellar bursa.  This indicates that there may be fluid within the bursal area

## 2024-06-24 RX ORDER — ACYCLOVIR 400 MG/1
TABLET ORAL
Qty: 12 TABLET | Refills: 1 | Status: SHIPPED | OUTPATIENT
Start: 2024-06-24

## 2024-07-01 ENCOUNTER — HOSPITAL ENCOUNTER (OUTPATIENT)
Facility: HOSPITAL | Age: 59
Discharge: HOME | End: 2024-07-01
Admitting: NURSE PRACTITIONER
Payer: COMMERCIAL

## 2024-07-01 DIAGNOSIS — Z12.31 ENCOUNTER FOR SCREENING MAMMOGRAM FOR BREAST CANCER: ICD-10-CM

## 2024-07-01 PROCEDURE — 77063 BREAST TOMOSYNTHESIS BI: CPT

## 2024-07-01 PROCEDURE — 77067 SCR MAMMO BI INCL CAD: CPT

## 2024-09-02 PROBLEM — Z00.00 PREVENTATIVE HEALTH CARE: Status: ACTIVE | Noted: 2024-09-02

## 2024-09-05 ENCOUNTER — LAB (OUTPATIENT)
Dept: LAB | Facility: HOSPITAL | Age: 59
End: 2024-09-05
Payer: COMMERCIAL

## 2024-09-05 ENCOUNTER — OFFICE VISIT (OUTPATIENT)
Dept: INTERNAL MEDICINE | Facility: CLINIC | Age: 59
End: 2024-09-05
Payer: COMMERCIAL

## 2024-09-05 VITALS
HEART RATE: 66 BPM | WEIGHT: 161 LBS | SYSTOLIC BLOOD PRESSURE: 120 MMHG | HEIGHT: 63 IN | OXYGEN SATURATION: 99 % | DIASTOLIC BLOOD PRESSURE: 82 MMHG | BODY MASS INDEX: 28.53 KG/M2

## 2024-09-05 DIAGNOSIS — E55.9 VITAMIN D DEFICIENCY: ICD-10-CM

## 2024-09-05 DIAGNOSIS — N31.9 NEUROGENIC BLADDER: ICD-10-CM

## 2024-09-05 DIAGNOSIS — Z00.00 PREVENTATIVE HEALTH CARE: ICD-10-CM

## 2024-09-05 DIAGNOSIS — Q06.8 TETHERED SPINAL CORD: ICD-10-CM

## 2024-09-05 DIAGNOSIS — K21.9 GASTROESOPHAGEAL REFLUX DISEASE WITHOUT ESOPHAGITIS: ICD-10-CM

## 2024-09-05 DIAGNOSIS — I10 BENIGN ESSENTIAL HYPERTENSION: ICD-10-CM

## 2024-09-05 DIAGNOSIS — Z00.00 PREVENTATIVE HEALTH CARE: Primary | ICD-10-CM

## 2024-09-05 DIAGNOSIS — E78.2 MIXED HYPERLIPIDEMIA: ICD-10-CM

## 2024-09-05 DIAGNOSIS — I73.00 RAYNAUD'S DISEASE WITHOUT GANGRENE: ICD-10-CM

## 2024-09-05 DIAGNOSIS — B00.9 HERPES SIMPLEX: ICD-10-CM

## 2024-09-05 LAB
BASOPHILS # BLD AUTO: 0.04 10*3/MM3 (ref 0–0.2)
BASOPHILS NFR BLD AUTO: 0.7 % (ref 0–1.5)
DEPRECATED RDW RBC AUTO: 37.7 FL (ref 37–54)
EOSINOPHIL # BLD AUTO: 0.29 10*3/MM3 (ref 0–0.4)
EOSINOPHIL NFR BLD AUTO: 4.8 % (ref 0.3–6.2)
ERYTHROCYTE [DISTWIDTH] IN BLOOD BY AUTOMATED COUNT: 11.8 % (ref 12.3–15.4)
HCT VFR BLD AUTO: 40.3 % (ref 34–46.6)
HGB BLD-MCNC: 13.4 G/DL (ref 12–15.9)
IMM GRANULOCYTES # BLD AUTO: 0.02 10*3/MM3 (ref 0–0.05)
IMM GRANULOCYTES NFR BLD AUTO: 0.3 % (ref 0–0.5)
LYMPHOCYTES # BLD AUTO: 1.45 10*3/MM3 (ref 0.7–3.1)
LYMPHOCYTES NFR BLD AUTO: 24.1 % (ref 19.6–45.3)
MCH RBC QN AUTO: 29.3 PG (ref 26.6–33)
MCHC RBC AUTO-ENTMCNC: 33.3 G/DL (ref 31.5–35.7)
MCV RBC AUTO: 88 FL (ref 79–97)
MONOCYTES # BLD AUTO: 0.61 10*3/MM3 (ref 0.1–0.9)
MONOCYTES NFR BLD AUTO: 10.1 % (ref 5–12)
NEUTROPHILS NFR BLD AUTO: 3.61 10*3/MM3 (ref 1.7–7)
NEUTROPHILS NFR BLD AUTO: 60 % (ref 42.7–76)
NRBC BLD AUTO-RTO: 0 /100 WBC (ref 0–0.2)
PLATELET # BLD AUTO: 332 10*3/MM3 (ref 140–450)
PMV BLD AUTO: 9.3 FL (ref 6–12)
RBC # BLD AUTO: 4.58 10*6/MM3 (ref 3.77–5.28)
WBC NRBC COR # BLD AUTO: 6.02 10*3/MM3 (ref 3.4–10.8)

## 2024-09-05 PROCEDURE — 99213 OFFICE O/P EST LOW 20 MIN: CPT | Performed by: INTERNAL MEDICINE

## 2024-09-05 PROCEDURE — 86231 EMA EACH IG CLASS: CPT

## 2024-09-05 PROCEDURE — 80050 GENERAL HEALTH PANEL: CPT

## 2024-09-05 PROCEDURE — 93000 ELECTROCARDIOGRAM COMPLETE: CPT | Performed by: INTERNAL MEDICINE

## 2024-09-05 PROCEDURE — 99396 PREV VISIT EST AGE 40-64: CPT | Performed by: INTERNAL MEDICINE

## 2024-09-05 PROCEDURE — 80061 LIPID PANEL: CPT

## 2024-09-05 PROCEDURE — 82784 ASSAY IGA/IGD/IGG/IGM EACH: CPT

## 2024-09-05 PROCEDURE — 86364 TISS TRNSGLTMNASE EA IG CLAS: CPT

## 2024-09-05 RX ORDER — ACYCLOVIR 400 MG/1
400 TABLET ORAL
Qty: 30 TABLET | Refills: 5 | Status: SHIPPED | OUTPATIENT
Start: 2024-09-05

## 2024-09-05 NOTE — PATIENT INSTRUCTIONS
Fasting lipid CBC CMP lipid TSH celiac disease panel  EKG normal discussed  Acyclovir 400 mg daily for HSV 1 prevention  Continue current therapy  Encouraged healthy cardiac diet with reduced carbs and weight loss  Encouraged continued walking exercise  Return in 6 months or as needed

## 2024-09-05 NOTE — ASSESSMENT & PLAN NOTE
Essential hypertension with current blood pressure 120/82 heart rate 66 O2 saturation 99% on carvedilol 6.25 mg twice daily continue therapy  Fasting CMP is pending  Today's EKG is sinus rhythm normal

## 2024-09-05 NOTE — ASSESSMENT & PLAN NOTE
Tethered spinal cord surgery by Dr. Clark in 2004 with secondary urinary retention requiring self cath overall doing well continue cath as necessary

## 2024-09-05 NOTE — ASSESSMENT & PLAN NOTE
Mixed hyperlipidemia on atorvastatin 40 mg daily denies myalgia arthralgia patient is mildly overweight with a weight of 161 and a BMI of 28.52 encouraged continued weight loss and healthy cardiac low-cholesterol diet and current medication  Fasting lipid and CMP pending

## 2024-09-05 NOTE — ASSESSMENT & PLAN NOTE
Vitamin D deficiency currently on calcium carbonate and vitamin D3 1000 units daily continue therapy
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
## 2024-09-05 NOTE — PROGRESS NOTES
[facility]     Padmini Cochran  1965   [id number]      Patient Care Team:  Ronnie Mas MD as PCP - General (Internal Medicine)  Gorge, LEO Forte as Nurse Practitioner (Obstetrics and Gynecology)    Chief Complaint::   Chief Complaint   Patient presents with    Annual Exam     Patient is fasting    family history     Daughter diagnosed with celiac disease.  Wants to be tested    fever blisters     Has had 5 this summer.  Wonders about daily treatment            HPI  History of Present Illness  The patient is a 59-year-old female who presents for an annual definitive visit. She has a family history of celiac disease and fever blisters, and a personal history of hypertension, hyperlipidemia, Raynaud's disease, vitamin D deficiency, GI reflux, and tethered spinal cord with neurogenic bladder.    She reports no recent changes in her health, diet, or medication. Her sleep pattern is irregular, with periods of difficulty falling asleep lasting 2 to 3 weeks, followed by improved sleep. She reports no headaches, dizziness, lightheadedness, vision problems, hearing issues, or allergy drainage. She has not undergone cataract surgery or been diagnosed with glaucoma. She reports no difficulty swallowing, coughing, wheezing, shortness of breath, or recent lung infections. She does not experience heart palpitations.    She experiences occasional indigestion, which she attributes to overeating. She has a history of irritable bowel syndrome (IBS) but has not had diarrhea for some time. When she does have diarrhea, it is usually explosive and occurs after overeating. Recently, she has been dealing with constipation more frequently than diarrhea.    She self-catheterizes three times a day. Since her last surgery, a sacrocolpopexy, she has been able to urinate better on her own.    She has had five fever blisters this summer, which are quite uncomfortable. She took Zovirax daily for 1 to 2 years in  her late 20s and did not have any fever blisters for about 15 years after stopping the medication. She is considering long-term use of Zovirax again.    She does not use mouthwash or mouth gargle. She uses hairspray and is right-handed. She lost 45 pounds in 2018 and has gained back 10 to 12 pounds.    FAMILY HISTORY  Her daughter and niece have celiac disease.         Patient Active Problem List   Diagnosis    Urinary retention    Tethered spinal cord    Neurogenic bladder    GERD (gastroesophageal reflux disease)    Depression    Irritable bowel syndrome with diarrhea    Fibrocystic breast changes, bilateral    Colon polyp    Mixed hyperlipidemia    Benign essential hypertension    Gastroesophageal reflux disease without esophagitis    Major depressive disorder, recurrent, mild    Anxiety    Esophagitis, reflux    Obesity (BMI 30.0-34.9)    History of spinal cord compression    Vitamin D deficiency    Raynaud's syndrome    Chest pain, unspecified    Family history of diabetes mellitus    Hyperlipidemia    Vaginal enterocele    Carpal tunnel syndrome of right wrist    Preventative health care    Post-menopause on HRT (hormone replacement therapy)    Preventative health care    Palpitations    Preventative health care    COVID-19 virus infection    Preventative health care    Labyrinthitis of both ears    Preventative health care    Herpes simplex        Past Medical History:   Diagnosis Date    Colon polyp     nodular fold sessile serrated polyp    Depression     GERD (gastroesophageal reflux disease)     Hyperlipidemia     Hypertension     Neurogenic bladder     Post-menopause on HRT (hormone replacement therapy)     Tethered spinal cord     Urinary retention        Past Surgical History:   Procedure Laterality Date    COLONOSCOPY  06/2012    Inflammed ICV    DILATATION AND CURETTAGE      ENDOMETRIAL ABLATION      ENDOMETRIAL ABLATION  2005    UTERINE  ABLATION    ENTEROCELE REPAIR      LAPAROSCOPIC HYSTERECTOMY   07/28/2014    LAPAROSCOPIC TUBAL LIGATION      OOPHORECTOMY Bilateral 07/2014    PELVIC LAPAROSCOPY      PERINEOPLASTY      POSTERIOR REPAIR      SACROCOLPOPEXY  05/12/2023    [facility], Dr. Rosario    SPINAL CORD UNTETHERING      SPINE SURGERY  2004    SPINAL CORD SURG    TUBAL ABDOMINAL LIGATION  04/1999       Family History   Problem Relation Age of Onset    Lymphoma Father     Hypertension Father     Pancreatic cancer Mother     Diabetes Mother     Coronary artery disease Mother         premature; angioplasty 8 09    Hypertension Mother     Migraines Sister     Celiac disease Daughter     Alzheimer's disease Paternal Grandmother     Cancer Maternal Grandmother         COLON    Colon cancer Maternal Grandmother     Coronary artery disease Maternal Grandmother     Coronary artery disease Maternal Grandfather     Breast cancer Neg Hx     Ovarian cancer Neg Hx        Social History     Socioeconomic History    Marital status:    Tobacco Use    Smoking status: Never    Smokeless tobacco: Never   Vaping Use    Vaping status: Never Used   Substance and Sexual Activity    Alcohol use: No    Drug use: No    Sexual activity: Yes     Partners: Male     Birth control/protection: Surgical, Post-menopausal, Hysterectomy       Allergies   Allergen Reactions    Levaquin [Levofloxacin] Confusion    Levamlodipine Unknown - Low Severity    Hydrochlorothiazide Other (See Comments)     Multiple symptoms; DYAZIDE     Triamterene Other (See Comments)     Multiple sympmtoms; DYAZIDE        Immunization History   Administered Date(s) Administered    COVID-19 (PFIZER) Purple Cap Monovalent 03/18/2021, 04/13/2021, 12/03/2021    Flu Vaccine Intradermal Quad 18-64YR 11/24/2018    Fluzone (or Fluarix & Flulaval for VFC) >6mos 11/03/2019, 10/22/2020    Hepatitis A 02/18/2019, 09/16/2019    Influenza Injectable Mdck Pf Quad 11/30/2023    Influenza, Unspecified 11/08/2021, 10/25/2022    Tdap 12/30/2015        Health Maintenance Due   Topic  "Date Due    ZOSTER VACCINE (1 of 2) Never done    HEPATITIS C SCREENING  Never done    PAP SMEAR  12/15/2023    COVID-19 Vaccine (4 - 2023-24 season) 09/01/2024    INFLUENZA VACCINE  08/01/2024        Review of Systems   Constitutional: Negative.    HENT: Negative.     Eyes: Negative.    Respiratory: Negative.     Cardiovascular: Negative.    Gastrointestinal: Negative.    Endocrine: Negative.    Genitourinary:         Urinary retention and self cath since 2004 for tethered spinal cord surgery  Recent urologic pelvic surgery of sacrocolpopexy May 17, 2023 at  with improvement in ability to urinate and slight reduction in need for self cathing   Musculoskeletal: Negative.    Skin: Negative.    Allergic/Immunologic: Negative.    Neurological: Negative.    Hematological: Negative.    Psychiatric/Behavioral: Negative.        Physical Exam  Eyes appear normal. Nose is clear. Throat appears normal. Right ear is clear with a normal eardrum. Left ear has a small amount of wax.  Neck appears normal.  Lungs are clear.  Heart rate is normal. Heart rhythm is regular. Heart has normal valves and normal heart sounds.  Abdomen appears normal.  Knees and hips move well, no fluid present. Excellent circulation in arms and legs, no fluid, good movement.    Vital Signs  Blood pressure is 122/80. Oxygen saturation is at 99 percent.    Vital Signs  Vitals:    09/05/24 1317   BP: 120/82   BP Location: Left arm   Patient Position: Sitting   Cuff Size: Adult   Pulse: 66   SpO2: 99%   Weight: 73 kg (161 lb)   Height: 160 cm (63\")   PainSc: 0-No pain     Body mass index is 28.52 kg/m².        Advance Care Planning         Current Outpatient Medications:     atorvastatin (LIPITOR) 40 MG tablet, TAKE 1 TABLET DAILY        (INCREASED DOSE), Disp: 90 tablet, Rfl: 3    buPROPion (WELLBUTRIN) 75 MG tablet, TAKE 2 TABLETS DAILY, Disp: 180 tablet, Rfl: 3    calcium carbonate (OS-ANNITA) 600 MG tablet, Take 1 tablet by mouth Daily., Disp: , Rfl:     " carvedilol (COREG) 6.25 MG tablet, TAKE 1 TABLET 2 TIMES DAILYWITH FOOD, Disp: 180 tablet, Rfl: 3    cholecalciferol (VITAMIN D3) 1000 UNITS tablet, Take 1 tablet by mouth Daily., Disp: , Rfl:     cycloSPORINE (RESTASIS) 0.05 % ophthalmic emulsion, Apply 1 drop to eye(s) as directed by provider 2 (Two) Times a Day. Both eyes, Disp: , Rfl:     diazePAM (VALIUM) 5 MG tablet, Take 1 tablet by mouth At Night As Needed for Anxiety., Disp: 30 tablet, Rfl: 2    estradiol (ESTRACE) 0.1 MG/GM vaginal cream, Insert 1 g into the vagina 2 (Two) Times a Week., Disp: 42.5 g, Rfl: 3    estradiol (Estrace) 1 MG tablet, Take 1 tablet by mouth Daily., Disp: 90 tablet, Rfl: 3    famotidine (PEPCID) 40 MG tablet, TAKE 1 TABLET DAILY, Disp: 90 tablet, Rfl: 3    acyclovir (ZOVIRAX) 400 MG tablet, TAKE 1 TABLET BY MOUTH THREE TIMES DAILY. NO MORE THAN 5 DOSES A DAY (Patient not taking: Reported on 9/5/2024), Disp: 12 tablet, Rfl: 1    acyclovir (ZOVIRAX) 400 MG tablet, Take 1 tablet by mouth Every 4 (Four) Hours While Awake. Take no more than 5 doses a day., Disp: 30 tablet, Rfl: 5    Physical Exam   HEENT: Pupils equal reactive ENT clear no facial asymmetry pharynx is clear sinuses nontender  NECK: No mass bruit thyromegaly neck vein distention  CHEST: Clear without rales or wheezing  CARDIAC: Regular rhythm without gallop or rub blood pressure heart rate stable  ABD: Liver spleen normal positive bowel sounds no bruit  : Deferred  NEURO: Intact  PSYCH: Normal  EXTREM: Normal  Skin: No rash     Results Review:    Results  Testing  EKG is normal.       ECG 12 Lead    Date/Time: 9/5/2024 5:44 PM  Performed by: Ronnie Mas MD    Authorized by: Ronnie Mas MD  Comparison: compared with previous ECG from 8/28/2023  Similar to previous ECG  Comparison to previous ECG: Current EKG is normal and similar to EKG of August 28, 2023  Rhythm: sinus rhythm  Rate: normal  Conduction: conduction normal  ST Segments: ST segments normal  T Waves: T  waves normal  QRS axis: normal  Other: no other findings    Clinical impression: normal ECG  Comments: Current EKG sinus rhythm normal with no ischemia and similar to EKG of August 28, 2023      Patient Wellness Counseling:   Plan of care reviewed with patient at the conclusion of today's visit. Education was provided in regards to diagnosis, diet and exercise, cervical cancer screening, self breast exams, breast cancer screening, and the importance of yearly mammograms.   Nutrition, family planning/contraception, physical activity, healthy weight,ways to reduce stress, adequate sleep, injury prevention, misuse of tobacco, alcohol and drugs, sexual behavior and STD's, dental health, mental health, and immunizations.    Management and any prescribed or recommended OTC medications.  Patient verbalizes understanding of and agreement with management plan.    Medication Review: Medications reviewed and noted    Patient wellness counseling  Exercise: Continue ADL  Diet: Continue healthy cardiac diet with reduce carbs and weight loss  Screening: CBC CMP lipid TSH celiac panel pending  Social History     Socioeconomic History    Marital status:    Tobacco Use    Smoking status: Never    Smokeless tobacco: Never   Vaping Use    Vaping status: Never Used   Substance and Sexual Activity    Alcohol use: No    Drug use: No    Sexual activity: Yes     Partners: Male     Birth control/protection: Surgical, Post-menopausal, Hysterectomy        Assessment/Plan:  Assessment & Plan  1. Annual definitive visit.  She presents with a healthy appearance, stable blood pressure, and a satisfactory heart rate. Her EKG results are within normal limits. She has gained 4 pounds since her last visit in October 2023. A comprehensive set of tests including CBC, chemistry panel, blood glucose, kidney function, liver function, electrolyte panel, lipid profile, thyroid function, and celiac disease panel will be conducted. She has been advised  to cover her ears when using hairspray.    2. Insomnia.  She reports difficulty sleeping, which she attributes to camping and changes in environment. She experiences cycles of poor sleep lasting two to three weeks, followed by periods of better sleep. No specific treatment was discussed during this visit.    3. Gastroesophageal Reflux Disease (GERD).  She experiences occasional indigestion, primarily related to overeating. She has been advised to avoid overeating to manage her symptoms.    4. Irritable Bowel Syndrome (IBS).  She reports that her IBS symptoms are currently more inclined towards constipation rather than diarrhea. When diarrhea occurs, it is explosive and usually follows overeating. No specific treatment was discussed during this visit.    5. Neurogenic Bladder.  She continues to self-catheterize three to four times a day. She reports improved bladder function since her sacrocolpopexy surgery. No changes in her current management were discussed.    6. Fever Blisters.  She has experienced five fever blisters this summer. A prescription for acyclovir 400 mg, to be taken once daily for the first month and then every other day for the second month, has been provided. The prescription was sent to [facility].       Problem List Items Addressed This Visit          Cardiac and Vasculature    Mixed hyperlipidemia    Overview     History of mixed hyperlipidemia on atorvastatin 40 mg daily denies myalgia arthralgia         Current Assessment & Plan      Mixed hyperlipidemia on atorvastatin 40 mg daily denies myalgia arthralgia patient is mildly overweight with a weight of 161 and a BMI of 28.52 encouraged continued weight loss and healthy cardiac low-cholesterol diet and current medication  Fasting lipid and CMP pending           Relevant Medications    atorvastatin (LIPITOR) 40 MG tablet    Other Relevant Orders    Celiac Disease Panel    CBC & Differential    Comprehensive Metabolic Panel    Lipid Panel    TSH     Benign essential hypertension    Overview     History of essential hypertension on carvedilol 6.25 twice daily         Current Assessment & Plan     Essential hypertension with current blood pressure 120/82 heart rate 66 O2 saturation 99% on carvedilol 6.25 mg twice daily continue therapy  Fasting CMP is pending  Today's EKG is sinus rhythm normal         Relevant Medications    carvedilol (COREG) 6.25 MG tablet    Other Relevant Orders    Celiac Disease Panel    CBC & Differential    Comprehensive Metabolic Panel    Lipid Panel    TSH       Endocrine and Metabolic    Vitamin D deficiency    Overview     History of vitamin D deficiency currently on vitamin D3 1000 units daily         Current Assessment & Plan     Vitamin D deficiency currently on calcium carbonate and vitamin D3 1000 units daily continue therapy         Relevant Orders    Celiac Disease Panel    CBC & Differential    Comprehensive Metabolic Panel    Lipid Panel    TSH       Gastrointestinal Abdominal     GERD (gastroesophageal reflux disease)    Overview     History of GERD gastritis previously on omeprazole 20 mg daily which is effective in his therapy however insurance is having difficulty covering the cost of the medication.  Will change to H2 blocker famotidine 40 mg daily         Current Assessment & Plan     GERD esophagitis on famotidine 40 mg daily continue therapy         Relevant Medications    famotidine (PEPCID) 40 MG tablet    Other Relevant Orders    Celiac Disease Panel    CBC & Differential    Comprehensive Metabolic Panel    Lipid Panel    TSH       Genitourinary and Reproductive     Neurogenic bladder    Overview     Patient has neurogenic bladder requiring self catheter catheterization 3-4 times per 24 hours no recent infections stable.         Current Assessment & Plan     Neurogenic bladder since her tethered spinal cord surgery of 2004 with self cath 3-4 times daily with recent improvement on ability to void since her  surgery of sacralcolposcopy of May 17, 2023         Relevant Orders    Celiac Disease Panel    CBC & Differential    Comprehensive Metabolic Panel    Lipid Panel    TSH       Health Encounters    Preventative health care - Primary    Overview     59-year-old female presents for annual preventative visit and physical examination on September 5, 2024         Relevant Orders    Celiac Disease Panel    CBC & Differential    Comprehensive Metabolic Panel    Lipid Panel    TSH       Infectious Diseases    Herpes simplex    Overview     Frequent mouth herpes simplex lesions x 5 in the past few months they are quite painful currently 1 lesion that is improving on the right upper lip         Current Assessment & Plan     Will start the patient on acyclovir 400 mg daily and prescription written         Relevant Medications    acyclovir (ZOVIRAX) 400 MG tablet    acyclovir (ZOVIRAX) 400 MG tablet       Neuro    Tethered spinal cord    Overview     Symptomatic urinary retention treated with tethered spinal cord release by Dr. Casey Clark 2004 with current self cath 3-4 times per day for urinary retention         Current Assessment & Plan     Tethered spinal cord surgery by Dr. Clark in 2004 with secondary urinary retention requiring self cath overall doing well continue cath as necessary         Relevant Orders    Celiac Disease Panel    CBC & Differential    Comprehensive Metabolic Panel    Lipid Panel    TSH        Patient Instructions   Fasting lipid CBC CMP lipid TSH celiac disease panel  EKG normal discussed  Acyclovir 400 mg daily for HSV 1 prevention  Continue current therapy  Encouraged healthy cardiac diet with reduced carbs and weight loss  Encouraged continued walking exercise  Return in 6 months or as needed     CMP:  Lab Results   Component Value Date    BUN 16 02/28/2024    CREATININE 0.94 02/28/2024    EGFRIFNONA 73 08/19/2021    BCR 17.0 02/28/2024     02/28/2024    K 4.4 02/28/2024    CO2 27.0  "02/28/2024    CALCIUM 9.9 02/28/2024    ALBUMIN 4.3 02/28/2024    BILITOT 0.4 02/28/2024    ALKPHOS 95 02/28/2024    AST 15 02/28/2024    ALT 15 02/28/2024     HbA1c:  Lab Results   Component Value Date    HGBA1C 5.70 (H) 07/30/2019     Microalbumin:  No results found for: \"MICROALBUR\", \"POCMALB\", \"POCCREAT\"  Lipid Panel  Lab Results   Component Value Date    CHOL 177 02/28/2024    TRIG 189 (H) 02/28/2024    HDL 47 02/28/2024    LDL 98 02/28/2024    AST 15 02/28/2024    ALT 15 02/28/2024        Counseling was given to patient for the following topics: disease prevention.    Plan of care reviewed with patient at the conclusion of today's visit. Education was provided regarding diagnosis, management, and any prescribed or recommended OTC medications.Patient verbalizes understanding of and agreement with management plan.       09/05/24   13:30 EDT    Patient or patient representative verbalized consent for the use of Ambient Listening during the visit with  Ronnie Mas MD for chart documentation. 9/5/2024  17:46 EDT          "

## 2024-09-06 LAB
ALBUMIN SERPL-MCNC: 4.6 G/DL (ref 3.5–5.2)
ALBUMIN/GLOB SERPL: 1.6 G/DL
ALP SERPL-CCNC: 90 U/L (ref 39–117)
ALT SERPL W P-5'-P-CCNC: 13 U/L (ref 1–33)
ANION GAP SERPL CALCULATED.3IONS-SCNC: 9 MMOL/L (ref 5–15)
AST SERPL-CCNC: 17 U/L (ref 1–32)
BILIRUB SERPL-MCNC: 0.4 MG/DL (ref 0–1.2)
BUN SERPL-MCNC: 18 MG/DL (ref 6–20)
BUN/CREAT SERPL: 18.9 (ref 7–25)
CALCIUM SPEC-SCNC: 10.1 MG/DL (ref 8.6–10.5)
CHLORIDE SERPL-SCNC: 101 MMOL/L (ref 98–107)
CHOLEST SERPL-MCNC: 185 MG/DL (ref 0–200)
CO2 SERPL-SCNC: 28 MMOL/L (ref 22–29)
CREAT SERPL-MCNC: 0.95 MG/DL (ref 0.57–1)
EGFRCR SERPLBLD CKD-EPI 2021: 69.6 ML/MIN/1.73
GLOBULIN UR ELPH-MCNC: 2.9 GM/DL
GLUCOSE SERPL-MCNC: 85 MG/DL (ref 65–99)
HDLC SERPL-MCNC: 47 MG/DL (ref 40–60)
LDLC SERPL CALC-MCNC: 96 MG/DL (ref 0–100)
LDLC/HDLC SERPL: 1.88 {RATIO}
POTASSIUM SERPL-SCNC: 4.3 MMOL/L (ref 3.5–5.2)
PROT SERPL-MCNC: 7.5 G/DL (ref 6–8.5)
SODIUM SERPL-SCNC: 138 MMOL/L (ref 136–145)
TRIGL SERPL-MCNC: 249 MG/DL (ref 0–150)
TSH SERPL DL<=0.05 MIU/L-ACNC: 1.94 UIU/ML (ref 0.27–4.2)
VLDLC SERPL-MCNC: 42 MG/DL (ref 5–40)

## 2024-09-09 LAB
ENDOMYSIUM IGA SER QL: NEGATIVE
IGA SERPL-MCNC: 91 MG/DL (ref 87–352)
TTG IGA SER-ACNC: <2 U/ML (ref 0–3)

## 2024-10-15 DIAGNOSIS — F41.9 ANXIETY: ICD-10-CM

## 2024-10-15 RX ORDER — DIAZEPAM 5 MG
5 TABLET ORAL NIGHTLY PRN
Qty: 30 TABLET | Refills: 2 | Status: SHIPPED | OUTPATIENT
Start: 2024-10-15

## 2024-10-15 NOTE — TELEPHONE ENCOUNTER
Rx Refill Note  Requested Prescriptions     Pending Prescriptions Disp Refills    diazePAM (VALIUM) 5 MG tablet 30 tablet 2     Sig: Take 1 tablet by mouth At Night As Needed for Anxiety.      Last office visit with prescribing clinician: 9/5/2024   Last telemedicine visit with prescribing clinician: Visit date not found   Next office visit with prescribing clinician: 3/6/2025     LA: 02/27/23 #30 2R                          Would you like a call back once the refill request has been completed: [] Yes [] No    If the office needs to give you a call back, can they leave a voicemail: [] Yes [] No    Gayla Wyatt LPN  10/15/24, 08:35 EDT

## 2024-11-21 RX ORDER — FAMOTIDINE 40 MG/1
40 TABLET, FILM COATED ORAL DAILY
Qty: 90 TABLET | Refills: 1 | Status: SHIPPED | OUTPATIENT
Start: 2024-11-21

## 2024-11-21 NOTE — TELEPHONE ENCOUNTER
Rx Refill Note  Requested Prescriptions     Pending Prescriptions Disp Refills    famotidine (PEPCID) 40 MG tablet 90 tablet 1     Sig: Take 1 tablet by mouth Daily.      Last office visit with prescribing clinician: 9/5/2024   Last filled : 12/01/23  Last telemedicine visit with prescribing clinician: Visit date not found   Next office visit with prescribing clinician: 3/6/2025                         Would you like a call back once the refill request has been completed: [] Yes [] No    If the office needs to give you a call back, can they leave a voicemail: [] Yes [] No    Rylee James MA  11/21/24, 10:35 EST

## 2024-12-12 RX ORDER — BUPROPION HYDROCHLORIDE 75 MG/1
TABLET ORAL
Qty: 180 TABLET | Refills: 3 | Status: SHIPPED | OUTPATIENT
Start: 2024-12-12

## 2025-01-02 ENCOUNTER — OFFICE VISIT (OUTPATIENT)
Dept: INTERNAL MEDICINE | Facility: CLINIC | Age: 60
End: 2025-01-02
Payer: COMMERCIAL

## 2025-01-02 VITALS
SYSTOLIC BLOOD PRESSURE: 146 MMHG | DIASTOLIC BLOOD PRESSURE: 84 MMHG | BODY MASS INDEX: 28.7 KG/M2 | WEIGHT: 162 LBS | TEMPERATURE: 98.2 F | HEART RATE: 76 BPM | HEIGHT: 63 IN

## 2025-01-02 DIAGNOSIS — J01.10 ACUTE NON-RECURRENT FRONTAL SINUSITIS: Primary | ICD-10-CM

## 2025-01-02 PROCEDURE — 99214 OFFICE O/P EST MOD 30 MIN: CPT

## 2025-01-02 RX ORDER — AZITHROMYCIN 250 MG/1
TABLET, FILM COATED ORAL
Qty: 6 TABLET | Refills: 0 | Status: SHIPPED | OUTPATIENT
Start: 2025-01-02

## 2025-01-02 NOTE — PROGRESS NOTES
Acute Office Visit      Name: Padmini Cochran    : 1965     MRN: [id number]   Care Team: Patient Care Team:  Ronnie Mas MD as PCP - General (Internal Medicine)  [facility], [place] (Inactive) as Nurse Practitioner (Obstetrics and Gynecology)    Chief Complaint  Hoarse (Lots of drainage. Symptoms started 24)    Subjective     History of Present Illness:    Padmini is a pleasant 59-year-old female who comes to the office with continued cold symptoms.    She reports that her symptoms started on 2024 with a sore throat and headache.  This lasted approximately 6-7 days and worsened to include nasal and sinus congestion, hoarseness of her voice, and a dry cough.  She is currently experiencing these continued symptoms with a cough that is worse at night and continued pain and pressure throughout her sinus cavities.    She has been attempting OTC Mucinex and Afrin nasal spray for the last 6-7 days.    Padmini denies chest pain, shortness of breath, fever.    Past Medical History:   Diagnosis Date    Anxiety     Colon polyp     nodular fold sessile serrated polyp    Depression     Diverticulosis     GERD (gastroesophageal reflux disease)     Hyperlipidemia     Hypertension     Irritable bowel syndrome     Neurogenic bladder     Post-menopause on HRT (hormone replacement therapy)     Tethered spinal cord     Urinary retention     Urinary tract infection        Past Surgical History:   Procedure Laterality Date    COLONOSCOPY  2012    Inflammed ICV    DILATATION AND CURETTAGE      ENDOMETRIAL ABLATION      ENDOMETRIAL ABLATION      UTERINE  ABLATION    ENTEROCELE REPAIR      LAPAROSCOPIC HYSTERECTOMY  2014    LAPAROSCOPIC TUBAL LIGATION      OOPHORECTOMY Bilateral 2014    PELVIC LAPAROSCOPY      PERINEOPLASTY      POSTERIOR REPAIR      SACROCOLPOPEXY  2023    [facility], Dr. Rosario    SPINAL CORD UNTETHERING      SPINE SURGERY  2004    SPINAL CORD SURG    TUBAL  ABDOMINAL LIGATION  04/1999       Social History     Socioeconomic History    Marital status:    Tobacco Use    Smoking status: Never    Smokeless tobacco: Never   Vaping Use    Vaping status: Never Used   Substance and Sexual Activity    Alcohol use: No    Drug use: No    Sexual activity: Yes     Partners: Male     Birth control/protection: Surgical         Current Outpatient Medications:     acyclovir (ZOVIRAX) 400 MG tablet, Take 1 tablet by mouth Every 4 (Four) Hours While Awake. Take no more than 5 doses a day., Disp: 30 tablet, Rfl: 5    atorvastatin (LIPITOR) 40 MG tablet, TAKE 1 TABLET DAILY        (INCREASED DOSE), Disp: 90 tablet, Rfl: 3    buPROPion (WELLBUTRIN) 75 MG tablet, TAKE 2 TABLETS DAILY, Disp: 180 tablet, Rfl: 3    calcium carbonate (OS-ANNITA) 600 MG tablet, Take 1 tablet by mouth Daily., Disp: , Rfl:     carvedilol (COREG) 6.25 MG tablet, TAKE 1 TABLET 2 TIMES DAILYWITH FOOD, Disp: 180 tablet, Rfl: 3    cholecalciferol (VITAMIN D3) 1000 UNITS tablet, Take 1 tablet by mouth Daily., Disp: , Rfl:     cycloSPORINE (RESTASIS) 0.05 % ophthalmic emulsion, Apply 1 drop to eye(s) as directed by provider 2 (Two) Times a Day. Both eyes, Disp: , Rfl:     diazePAM (VALIUM) 5 MG tablet, Take 1 tablet by mouth At Night As Needed for Anxiety., Disp: 30 tablet, Rfl: 2    estradiol (ESTRACE) 0.1 MG/GM vaginal cream, Insert 1 g into the vagina 2 (Two) Times a Week., Disp: 42.5 g, Rfl: 3    estradiol (Estrace) 1 MG tablet, Take 1 tablet by mouth Daily., Disp: 90 tablet, Rfl: 3    famotidine (PEPCID) 40 MG tablet, Take 1 tablet by mouth Daily., Disp: 90 tablet, Rfl: 1    acyclovir (ZOVIRAX) 400 MG tablet, TAKE 1 TABLET BY MOUTH THREE TIMES DAILY. NO MORE THAN 5 DOSES A DAY (Patient not taking: Reported on 1/2/2025), Disp: 12 tablet, Rfl: 1    azithromycin (Zithromax Z-Ayan) 250 MG tablet, Take 2 tablets by mouth on day 1, then 1 tablet daily on days 2-5, Disp: 6 tablet, Rfl: 0    Procedures    PHQ-9 Total  "Score:      Objective     Vital Signs  /84 (BP Location: Left arm, Patient Position: Sitting, Cuff Size: Adult)   Pulse 76   Temp 98.2 °F (36.8 °C) (Temporal)   Ht 160 cm (62.99\")   Wt 73.5 kg (162 lb)   BMI 28.70 kg/m²   Estimated body mass index is 28.7 kg/m² as calculated from the following:    Height as of this encounter: 160 cm (62.99\").    Weight as of this encounter: 73.5 kg (162 lb).            Physical Exam  Vitals and nursing note reviewed.   HENT:      Head: Normocephalic.      Right Ear: Ear canal and external ear normal. A middle ear effusion is present.      Left Ear: Ear canal and external ear normal. A middle ear effusion is present.      Nose: Congestion present.      Mouth/Throat:      Mouth: Mucous membranes are moist.      Pharynx: Posterior oropharyngeal erythema present.   Eyes:      Pupils: Pupils are equal, round, and reactive to light.   Cardiovascular:      Rate and Rhythm: Normal rate.   Pulmonary:      Effort: Pulmonary effort is normal.      Breath sounds: Normal breath sounds.   Skin:     General: Skin is warm and dry.   Neurological:      General: No focal deficit present.      Mental Status: She is alert and oriented to person, place, and time.   Psychiatric:         Mood and Affect: Mood normal.         Behavior: Behavior normal.         Thought Content: Thought content normal.         Judgment: Judgment normal.          Assessment and Plan     Assessment/Plan:  Diagnoses and all orders for this visit:    1. Acute non-recurrent frontal sinusitis (Primary)  -     azithromycin (Zithromax Z-Ayan) 250 MG tablet; Take 2 tablets by mouth on day 1, then 1 tablet daily on days 2-5  Dispense: 6 tablet; Refill: 0  -Start azithromycin as prescribed.  -May continue OTC medications as needed for symptoms, fever, pain or discomfort.  -May benefit from oral antihistamine x 14 days.  -May benefit from fluticasone nasal spray-2 sprays each nostril daily x 14 days.  -Push oral fluids to " promote hydration.  -If symptoms continue or worsen, return to office.       There are no Patient Instructions on file for this visit.  Plan of care reviewed with patient at the conclusion of today's visit. Education was provided regarding diagnosis, management and any prescribed or recommended OTC medications.  Patient verbalizes understanding of and agreement with management plan.    Follow Up  Return for Next Scheduled Follow up.    Treva Cates, APRN

## 2025-02-12 RX ORDER — CARVEDILOL 6.25 MG/1
TABLET ORAL
Qty: 180 TABLET | Refills: 3 | Status: SHIPPED | OUTPATIENT
Start: 2025-02-12

## 2025-02-12 RX ORDER — ATORVASTATIN CALCIUM 40 MG/1
TABLET, FILM COATED ORAL
Qty: 90 TABLET | Refills: 3 | Status: SHIPPED | OUTPATIENT
Start: 2025-02-12

## 2025-03-06 ENCOUNTER — OFFICE VISIT (OUTPATIENT)
Dept: INTERNAL MEDICINE | Facility: CLINIC | Age: 60
End: 2025-03-06
Payer: COMMERCIAL

## 2025-03-06 VITALS
SYSTOLIC BLOOD PRESSURE: 124 MMHG | BODY MASS INDEX: 27.64 KG/M2 | OXYGEN SATURATION: 97 % | WEIGHT: 156 LBS | DIASTOLIC BLOOD PRESSURE: 82 MMHG | HEART RATE: 76 BPM | HEIGHT: 63 IN

## 2025-03-06 DIAGNOSIS — I10 BENIGN ESSENTIAL HYPERTENSION: ICD-10-CM

## 2025-03-06 DIAGNOSIS — E78.2 MIXED HYPERLIPIDEMIA: Primary | ICD-10-CM

## 2025-03-06 DIAGNOSIS — Q06.8 TETHERED SPINAL CORD: ICD-10-CM

## 2025-03-06 DIAGNOSIS — B00.9 HERPES SIMPLEX: ICD-10-CM

## 2025-03-06 DIAGNOSIS — H83.03 LABYRINTHITIS OF BOTH EARS: ICD-10-CM

## 2025-03-06 DIAGNOSIS — K21.9 GASTROESOPHAGEAL REFLUX DISEASE WITHOUT ESOPHAGITIS: ICD-10-CM

## 2025-03-06 PROCEDURE — 99214 OFFICE O/P EST MOD 30 MIN: CPT | Performed by: INTERNAL MEDICINE

## 2025-03-06 RX ORDER — ACYCLOVIR 400 MG/1
400 TABLET ORAL EVERY OTHER DAY
Start: 2025-03-06

## 2025-03-06 NOTE — PROGRESS NOTES
[facility]     Padmini Cochran  1965   [id number]      Patient Care Team:  Ronnie Mas MD as PCP - General (Internal Medicine)  Rosemarie Pennington APRN (Inactive) as Nurse Practitioner (Obstetrics and Gynecology)    Chief Complaint::   Chief Complaint   Patient presents with    Hypertension     Follow up            HPI  History of Present Illness  The patient is a 59-year-old female who presents to the office for a follow-up of hypertension, mixed hyperlipidemia, GERD, reflux, and tethered spinal cord with urinary retention.    She reports overall good health with no recent changes in her medical condition or medication regimen. She has been adhering to an every-other-day schedule of acyclovir 400 mg, which she finds effective and cost-efficient. She has not experienced any adverse effects from this medication.    She has been experiencing vertigo, predominantly when lying down to sleep or changing positions during the night, a symptom that has persisted for approximately 2 months. The vertigo typically resolves after a brief period of rest. She has not noticed any significant improvement with Valium. She was previously prescribed meclizine, which she still possesses. Her bedtime is usually between 10:00 PM and 11:00 PM. She has consulted with an ENT specialist, who provided her with some exercises to perform at home. She reports no changes in vision, ear or throat issues, difficulty swallowing, respiratory symptoms such as coughing or wheezing, gastrointestinal symptoms such as nausea, vomiting, or upset stomach, or urinary tract infections. She maintains her ear hygiene using a washcloth.    She reports experiencing palpitations once a week or biweekly, each episode lasting only a few seconds.    Her bladder function is abnormal, but stable, and she self-catheterizes 3 to 4 times daily. She is able to discern when her bladder is full. She reports no issues with her hips, knees, or  ankles.    She has lost some weight and has resumed her Weight Watchers program. She attempts to walk on the treadmill 2 to 3 times per week.    MEDICATIONS  Current: Acyclovir, Valium, meclizine.    IMMUNIZATIONS  She received an influenza vaccine in December.      Patient Active Problem List   Diagnosis    Urinary retention    Tethered spinal cord    Neurogenic bladder    GERD (gastroesophageal reflux disease)    Depression    Irritable bowel syndrome with diarrhea    Fibrocystic breast changes, bilateral    Colon polyp    Mixed hyperlipidemia    Benign essential hypertension    Gastroesophageal reflux disease without esophagitis    Major depressive disorder, recurrent, mild    Anxiety    Esophagitis, reflux    Obesity (BMI 30.0-34.9)    History of spinal cord compression    Vitamin D deficiency    Raynaud's syndrome    Chest pain, unspecified    Family history of diabetes mellitus    Hyperlipidemia    Vaginal enterocele    Carpal tunnel syndrome of right wrist    Preventative health care    Post-menopause on HRT (hormone replacement therapy)    Preventative health care    Palpitations    Preventative health care    COVID-19 virus infection    Preventative health care    Labyrinthitis of both ears    Preventative health care    Herpes simplex        Past Medical History:   Diagnosis Date    Anxiety     Colon polyp     nodular fold sessile serrated polyp    Depression     Diverticulosis 2023    GERD (gastroesophageal reflux disease)     Hyperlipidemia     Hypertension     Irritable bowel syndrome     Neurogenic bladder     Post-menopause on HRT (hormone replacement therapy)     Tethered spinal cord     Urinary retention     Urinary tract infection        Past Surgical History:   Procedure Laterality Date    COLONOSCOPY  06/2012    Inflammed ICV    DILATATION AND CURETTAGE      ENDOMETRIAL ABLATION      ENDOMETRIAL ABLATION  2005    UTERINE  ABLATION    ENTEROCELE REPAIR      LAPAROSCOPIC HYSTERECTOMY  07/28/2014     LAPAROSCOPIC TUBAL LIGATION      OOPHORECTOMY Bilateral 07/2014    PELVIC LAPAROSCOPY      PERINEOPLASTY      POSTERIOR REPAIR      SACROCOLPOPEXY  05/12/2023    [facility], Dr. Rosario    SPINAL CORD UNTETHERING      SPINE SURGERY  2004    SPINAL CORD SURG    TUBAL ABDOMINAL LIGATION  04/1999       Family History   Problem Relation Age of Onset    Lymphoma Father     Hypertension Father     Cancer Father     Pancreatic cancer Mother     Diabetes Mother     Coronary artery disease Mother         premature; angioplasty 8 09    Hypertension Mother     Cancer Mother     Migraines Sister     Celiac disease Daughter     Alzheimer's disease Paternal Grandmother     Cancer Maternal Grandmother         COLON    Colon cancer Maternal Grandmother     Coronary artery disease Maternal Grandmother     Coronary artery disease Maternal Grandfather     Breast cancer Neg Hx     Ovarian cancer Neg Hx        Social History     Socioeconomic History    Marital status:    Tobacco Use    Smoking status: Never    Smokeless tobacco: Never   Vaping Use    Vaping status: Never Used   Substance and Sexual Activity    Alcohol use: No    Drug use: No    Sexual activity: Yes     Partners: Male     Birth control/protection: Surgical       Allergies   Allergen Reactions    Levaquin [Levofloxacin] Confusion    Levamlodipine Unknown - Low Severity    Hydrochlorothiazide Other (See Comments)     Multiple symptoms; DYAZIDE     Triamterene Other (See Comments)     Multiple sympmtoms; DYAZIDE        Review of Systems     HEENT: Denies visual change denies hearing change complains of occasional positional vertigo and has used some meclizine and modified Epley maneuvers and is seen ENT episodes last approximately a few seconds and are currently not developed a  NECK: Denies pain stiffness swelling dysphagia  CHEST: Denies cough wheeze or shortness of breath  CARDIAC: Denies chest pain has occasional palpitations is on carvedilol 6.25 and currently  "stable  ABD: Denies abdominal pain nausea vomiting  : Since her spinal cord surgery 2004 has had a neurologic urinary retention requiring self cath 3-4 times daily with no recent urinary tract infections and currently stable  NEURO: Urinary retention from tethered spinal cord surgery of 2004 with chronic bladder self cath  PSYCH: Doing well with bupropion and Valium as needed  EXTREM: Minor Chon soreness without significant myalgia arthralgia or edema  SKIN: At bedtime 2 lesions of lips are well-controlled with acyclovir every other day    Vital Signs  Vitals:    03/06/25 1331   BP: 124/82   BP Location: Left arm   Patient Position: Sitting   Cuff Size: Adult   Pulse: 76   SpO2: 97%   Weight: 70.8 kg (156 lb)   Height: 160 cm (63\")   PainSc: 0-No pain     Body mass index is 27.63 kg/m².  BMI is >= 25 and <30. (Overweight) The following options were offered after discussion;: exercise counseling/recommendations and nutrition counseling/recommendations     Advance Care Planning         Current Outpatient Medications:     acyclovir (ZOVIRAX) 400 MG tablet, Take 1 tablet by mouth Every Other Day. Take no more than 5 doses a day., Disp: , Rfl:     atorvastatin (LIPITOR) 40 MG tablet, TAKE 1 TABLET DAILY        (INCREASED DOSE), Disp: 90 tablet, Rfl: 3    buPROPion (WELLBUTRIN) 75 MG tablet, TAKE 2 TABLETS DAILY, Disp: 180 tablet, Rfl: 3    calcium carbonate (OS-ANNITA) 600 MG tablet, Take 1 tablet by mouth Daily., Disp: , Rfl:     carvedilol (COREG) 6.25 MG tablet, TAKE 1 TABLET 2 TIMES DAILYWITH FOOD, Disp: 180 tablet, Rfl: 3    cholecalciferol (VITAMIN D3) 1000 UNITS tablet, Take 1 tablet by mouth Daily., Disp: , Rfl:     cycloSPORINE (RESTASIS) 0.05 % ophthalmic emulsion, Apply 1 drop to eye(s) as directed by provider 2 (Two) Times a Day. Both eyes, Disp: , Rfl:     diazePAM (VALIUM) 5 MG tablet, Take 1 tablet by mouth At Night As Needed for Anxiety., Disp: 30 tablet, Rfl: 2    estradiol (ESTRACE) 0.1 MG/GM vaginal " cream, Insert 1 g into the vagina 2 (Two) Times a Week., Disp: 42.5 g, Rfl: 3    estradiol (Estrace) 1 MG tablet, Take 1 tablet by mouth Daily., Disp: 90 tablet, Rfl: 3    famotidine (PEPCID) 40 MG tablet, Take 1 tablet by mouth Daily., Disp: 90 tablet, Rfl: 1    Physical Exam     ACE III MINI        Physical Exam  Physical Exam  Eyes appear normal. Throat appears healthy. Eardrum appears normal. Canal has a very small amount of wax.  Lungs are clear.  Heart rhythm is regular. Heart rate is normal.  Good bowel sounds. Spleen is normal. Liver edge is normal. Abdomen is not sore.  Circulation is good, no fluid.    Vital Signs  Blood pressure readings are 132/88 and 128/86. Heart rate is normal. Oxygen saturation is at 97 percent.  HEENT: Pupils equal reactive ENT clear no facial asymmetry pharynx is clear sinuses nontender and no cerumen  NECK: No masses bruit thyromegaly neck vein distention  CHEST: Clear  CARDIAC: Regular rhythm without gallop or rub blood pressure heart rate stable  ABD: Liver spleen normal positive bowel sounds no bruit  : Deferred  NEURO: Intact  PSYCH: Stress depression well-controlled with bupropion and Valium  EXTREM: Minimal arthritic change no edema good range of motion  Skin: At bedtime 2 lesions well-controlled with prophylactic acyclovir every other day     Results Review:    No results found for this or any previous visit (from the past 4 weeks).    Procedures continue with self Epley maneuvers    Medication Review: Medications reviewed and noted    Patient wellness counseling  Exercise: Walking exercise  Diet: Healthy cardiac diet  Screening:  Social History     Socioeconomic History    Marital status:    Tobacco Use    Smoking status: Never    Smokeless tobacco: Never   Vaping Use    Vaping status: Never Used   Substance and Sexual Activity    Alcohol use: No    Drug use: No    Sexual activity: Yes     Partners: Male     Birth control/protection: Surgical         Assessment/Plan:    Assessment & Plan  1. Hypertension.  Blood pressure readings were 132/88 and 128/86. Heart rate was normal, and oxygen saturation was 97 percent.    2. Mixed hyperlipidemia.  Last lab work in September showed total cholesterol at 185, LDL at 96, and triglycerides slightly high. Weight loss likely contributed to improved lipid profile.    3. Gastroesophageal reflux disease (GERD).  No new symptoms reported.    4. Tethered spinal cord with urinary retention.  She continues to self-catheterize 3 to 4 times daily and can tell when her bladder is full. No urinary tract infections reported.    5. Vertigo.  She reported experiencing vertigo mostly when lying down or rolling over in bed for the past 2 months. No associated nausea. She was advised to take meclizine about an hour before bedtime to see if it alleviates the symptoms. If effective, a prescription will be sent.    6. Palpitations.  She reported experiencing palpitations once a week or every other week, lasting only a second or two.    7. Cold sores.  She is currently taking acyclovir 400 mg every other day for prevention, which has been effective. A prescription for acyclovir 400 mg, to be taken every other day, has been provided.    Follow-up  The patient will follow up in 6 months for a physical examination.    Problem List Items Addressed This Visit          Cardiac and Vasculature    Mixed hyperlipidemia - Primary    Overview   History of mixed hyperlipidemia on atorvastatin 40 mg daily denies myalgia arthralgia         Current Assessment & Plan    Mixed hyperlipidemia on atorvastatin 40 mg daily denies myalgia arthralgia continue therapy with last cholesterol 185 on 9/5/2024 continue healthy low carb low-cholesterol diet and medication         Relevant Medications    atorvastatin (LIPITOR) 40 MG tablet    Benign essential hypertension    Overview   History of essential hypertension on carvedilol 6.25 twice daily         Current  Assessment & Plan    essential hypertension with blood pressure 124/82 heart rate of 76 O2 saturation 97% on carvedilol 6.25 mg twice daily to continue therapy and encouraged healthy cardiac weight loss diet         Relevant Medications    carvedilol (COREG) 6.25 MG tablet       ENT    Labyrinthitis of both ears    Overview   Patient complains of dizziness fullness in the ears worse with head rotation she has fallen and hit her head did not lose consciousness the area on her posterior scalp is less tender and is no longer swelling she denies visual changes denies significant hearing loss or tenderness but does complain of fullness congestion         Current Assessment & Plan   Patient has some mild recurrent positional benign hepatitis of both ears particularly when she lies down and turns her head with the symptoms lasting approximately 10 seconds she has used meclizine in the past has seen ENT and been prescribed Epley maneuvers  Discussed return visit to physical therapy for review of Epley maneuvers and she will let us know if she desires and can be done locally in her home community.            Gastrointestinal Abdominal     GERD (gastroesophageal reflux disease)    Overview   History of GERD gastritis previously on omeprazole 20 mg daily which is effective in his therapy however insurance is having difficulty covering the cost of the medication.  Will change to H2 blocker famotidine 40 mg daily         Current Assessment & Plan   GERD reflux on famotidine 40 mg daily controlled continue therapy         Relevant Medications    famotidine (PEPCID) 40 MG tablet       Infectious Diseases    Herpes simplex    Overview   Frequent mouth herpes simplex lesions x 5 in the past few months they are quite painful currently 1 lesion that is improving on the right upper lip         Current Assessment & Plan   Herpes simplex prophylaxis of acyclovir 400 mg every other day has been very beneficial with no further HSV-1  lesions continue therapy and prescription renewed         Relevant Medications    acyclovir (ZOVIRAX) 400 MG tablet       Neuro    Tethered spinal cord    Overview   Symptomatic urinary retention treated with tethered spinal cord release by Dr. Casey Clark 2004 with current self cath 3-4 times per day for urinary retention         Current Assessment & Plan   Surgery 2004 with chronic urinary retention requiring self cath 3-4 times daily currently stable without symptoms of UTI continue self cath             Patient Instructions   No lab obtained today  Note has lost 6 pounds on weight watchers diet with weight down to 156 and BMI of 27.63  Continue all current medications including acyclovir 400 every other day for prevention of HSV 1  Continue as needed  catheterization for retention  Continue walking exercise and healthy cardiac diet return for annual preventative in 6 months or as needed  If vertigo is not improved can prescribe physical therapy for potential Epley maneuvers       Plan of care reviewed with patient at the conclusion of today's visit. Education was provided regarding diagnosis, management, and any prescribed or recommended OTC medications.Patient verbalizes understanding of and agreement with management plan  03/06/25   13:37 EST    Patient or patient representative verbalized consent for the use of Ambient Listening during the visit with  Ronnie Mas MD for chart documentation. 3/8/2025  12:41 EST

## 2025-03-06 NOTE — PATIENT INSTRUCTIONS
No lab obtained today  Note has lost 6 pounds on weight watchers diet with weight down to 156 and BMI of 27.63  Continue all current medications including acyclovir 400 every other day for prevention of HSV 1  Continue as needed  catheterization for retention  Continue walking exercise and healthy cardiac diet return for annual preventative in 6 months or as needed  If vertigo is not improved can prescribe physical therapy for potential Epley maneuvers

## 2025-03-08 NOTE — ASSESSMENT & PLAN NOTE
Surgery 2004 with chronic urinary retention requiring self cath 3-4 times daily currently stable without symptoms of UTI continue self cath

## 2025-03-08 NOTE — ASSESSMENT & PLAN NOTE
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

## 2025-03-08 NOTE — ASSESSMENT & PLAN NOTE
essential hypertension with blood pressure 124/82 heart rate of 76 O2 saturation 97% on carvedilol 6.25 mg twice daily to continue therapy and encouraged healthy cardiac weight loss diet

## 2025-03-08 NOTE — ASSESSMENT & PLAN NOTE
Herpes simplex prophylaxis of acyclovir 400 mg every other day has been very beneficial with no further HSV-1 lesions continue therapy and prescription renewed

## 2025-03-08 NOTE — ASSESSMENT & PLAN NOTE
Mixed hyperlipidemia on atorvastatin 40 mg daily denies myalgia arthralgia continue therapy with last cholesterol 185 on 9/5/2024 continue healthy low carb low-cholesterol diet and medication

## 2025-04-05 RX ORDER — MECLIZINE HYDROCHLORIDE 25 MG/1
25 TABLET ORAL EVERY EVENING
Qty: 30 TABLET | Refills: 5 | Status: SHIPPED | OUTPATIENT
Start: 2025-04-05

## 2025-04-22 RX ORDER — OSELTAMIVIR PHOSPHATE 75 MG/1
75 CAPSULE ORAL 2 TIMES DAILY
Qty: 10 CAPSULE | Refills: 0 | Status: SHIPPED | OUTPATIENT
Start: 2025-04-22

## 2025-05-06 RX ORDER — DEXTROMETHORPHAN HYDROBROMIDE AND PROMETHAZINE HYDROCHLORIDE 15; 6.25 MG/5ML; MG/5ML
5 SYRUP ORAL 3 TIMES DAILY
Qty: 120 ML | Refills: 1 | Status: SHIPPED | OUTPATIENT
Start: 2025-05-06

## 2025-05-13 RX ORDER — FAMOTIDINE 40 MG/1
40 TABLET, FILM COATED ORAL DAILY
Qty: 90 TABLET | Refills: 1 | Status: SHIPPED | OUTPATIENT
Start: 2025-05-13

## 2025-05-13 NOTE — TELEPHONE ENCOUNTER
Rx Refill Note  Requested Prescriptions     Pending Prescriptions Disp Refills    famotidine (PEPCID) 40 MG tablet [Pharmacy Med Name: FAMOTIDINE TAB 40MG] 90 tablet 1     Sig: TAKE 1 TABLET DAILY      Last office visit with prescribing clinician: 3/6/2025   Last telemedicine visit with prescribing clinician: Visit date not found   Next office visit with prescribing clinician: 9/8/2025                         Would you like a call back once the refill request has been completed: [] Yes [] No    If the office needs to give you a call back, can they leave a voicemail: [] Yes [] No    Mishel Cottrell MA  05/13/25, 08:48 EDT

## 2025-05-21 ENCOUNTER — TRANSCRIBE ORDERS (OUTPATIENT)
Dept: ADMINISTRATIVE | Facility: HOSPITAL | Age: 60
End: 2025-05-21
Payer: COMMERCIAL

## 2025-05-21 DIAGNOSIS — Z12.31 SCREENING MAMMOGRAM FOR BREAST CANCER: Primary | ICD-10-CM

## 2025-06-02 ENCOUNTER — OFFICE VISIT (OUTPATIENT)
Dept: OBSTETRICS AND GYNECOLOGY | Facility: CLINIC | Age: 60
End: 2025-06-02
Payer: COMMERCIAL

## 2025-06-02 VITALS
HEIGHT: 63 IN | DIASTOLIC BLOOD PRESSURE: 80 MMHG | WEIGHT: 156 LBS | SYSTOLIC BLOOD PRESSURE: 142 MMHG | BODY MASS INDEX: 27.64 KG/M2

## 2025-06-02 DIAGNOSIS — N95.2 VAGINAL ATROPHY: ICD-10-CM

## 2025-06-02 DIAGNOSIS — Z79.890 HORMONE REPLACEMENT THERAPY (HRT): ICD-10-CM

## 2025-06-02 DIAGNOSIS — Z01.419 WOMEN'S ANNUAL ROUTINE GYNECOLOGICAL EXAMINATION: Primary | ICD-10-CM

## 2025-06-02 PROCEDURE — 99396 PREV VISIT EST AGE 40-64: CPT | Performed by: STUDENT IN AN ORGANIZED HEALTH CARE EDUCATION/TRAINING PROGRAM

## 2025-06-02 PROCEDURE — 99459 PELVIC EXAMINATION: CPT | Performed by: STUDENT IN AN ORGANIZED HEALTH CARE EDUCATION/TRAINING PROGRAM

## 2025-06-02 RX ORDER — ESTRADIOL 0.1 MG/G
1 CREAM VAGINAL 2 TIMES WEEKLY
Qty: 42.5 G | Refills: 3 | Status: SHIPPED | OUTPATIENT
Start: 2025-06-02

## 2025-06-02 RX ORDER — ESTRADIOL 1 MG/1
1 TABLET ORAL DAILY
Qty: 90 TABLET | Refills: 3 | Status: SHIPPED | OUTPATIENT
Start: 2025-06-02

## 2025-06-02 NOTE — PROGRESS NOTES
"Subjective   Chief Complaint   Patient presents with    Gynecologic Exam     Annual.     Padmini Cochran is a 60 y.o. year old  who is post-menopausal.  She is S/P RA-TLH, BSO, VVS, posterior repair with subsequent sacrocolpopexy, urethral bulking with Urogyn at  in  , presenting to be seen for her annual exam. She has follow up with them in 2 weeks. She is doing well and has no GYN complaints. She needs HRT refills today.     Of note, she also has a known neurogenic bladder and self caths 4-5 times per day.     This past year she has been on hormone replacement therapy, and is using vaginal estrogen cream.  There has not been vaginal bleeding in the last 12 months.  Menopausal symptoms are not present.    Mammogram scheduled for July   Colonoscopy due      SEXUAL Hx:  She is currently sexually active.  In the past year there there has been NO new sexual partners.    Condoms are never used.  She would not like to be screened for STD's at today's exam.  [place] is painful: no  HEALTH Hx:  She exercises regularly: no (but is planning to start exercising more). She is trying to get outside and walk more.   She wears her seat belt: yes.  She has concerns about domestic violence: no.    The following portions of the patient's history were reviewed and updated as appropriate:problem list, current medications, allergies, past family history, past medical history, past social history, and past surgical history.    Social History    Tobacco Use      Smoking status: Never      Smokeless tobacco: Never         Objective   /80 (BP Location: Left arm, Patient Position: Sitting, Cuff Size: Adult)   Ht 160 cm (62.99\")   Wt 70.8 kg (156 lb)   LMP  (LMP Unknown)   Breastfeeding No   BMI 27.64 kg/m²     General:  well developed; well nourished  no acute distress  mentation appropriate   Breasts:  Examined in supine position  Symmetric without masses or skin dimpling  Nipples normal without " inversion, lesions or discharge  There are no palpable axillary nodes  Fibrocystic changes are present both breasts without a discrete mass   Pelvis: Clinical staff was present for exam  External genitalia:  normal appearance of the external genitalia including Barth[place]'s and [place]'s glands.  :  urethral meatus normal; urethra normal:  Vaginal:  atrophic mucosal changes are present;  Cervix:  absent.  Uterus:  absent.  Adnexa:  absent, bilateral.  Rectal:  digital rectal exam not performed; anus visually normal appearing.        Assessment   Annual GYN exam s/p hysterectomy   Vaginal atrophy   Menopausal female currently on HRT - without significant symptoms affecting activities of daily living  She is up to date on all relevant gynecologic and colorectal screenings     Plan   Pap was not done today.  I explained to Padmini that the Pap smears are no longer recommended in patient's after hysterectomy.   I stressed to Padmini that she still should be seen to be seen yearly for a full physical including breast and pelvic exam.   She was encouraged to get yearly mammograms.  She should report any palpable breast lump(s) or skin changes regardless of mammographic findings.  I explained to Padmini that notification regarding her mammogram results will come from the center performing the study.  Our office will not be routinely calling with mammogram results.  It is her responsibility to make sure that the results from the mammogram are communicated to her by the breast center.  If she has any questions about the results, she is welcome to call our office anytime.  Continue vaginal estrogen cream. Refill sent to listed pharmacy.   Discussed risk versus benefits of continuing HRT, and patient denies any contraindications to therapy.  Refill for 1 year sent to listed pharmacy.  The importance of keeping all planned follow-up and taking all medications as prescribed was emphasized.  Follow up for annual exam in 1 year or  sooner PRN     New Medications Ordered This Visit   Medications    estradiol (Estrace) 1 MG tablet     Sig: Take 1 tablet by mouth Daily.     Dispense:  90 tablet     Refill:  3    estradiol (ESTRACE) 0.1 MG/GM vaginal cream     Sig: Insert 1 g into the vagina 2 (Two) Times a Week.     Dispense:  42.5 g     Refill:  3          This note was electronically signed.    Lillian Reed MD   June 2, 2025

## 2025-06-23 RX ORDER — ACYCLOVIR 400 MG/1
TABLET ORAL
Qty: 30 TABLET | Refills: 2 | Status: SHIPPED | OUTPATIENT
Start: 2025-06-23

## 2025-07-04 LAB
NCCN CRITERIA FLAG: ABNORMAL
TYRER CUZICK SCORE: 6.7

## 2025-07-07 ENCOUNTER — RESULTS FOLLOW-UP (OUTPATIENT)
Facility: HOSPITAL | Age: 60
End: 2025-07-07
Payer: COMMERCIAL

## 2025-07-07 NOTE — PROGRESS NOTES
This patient recently completed the CARE risk assessment for a mammogram appointment. Based on the patient's responses, NCCN criteria for genetic testing was met. At the time of the assessment, the patient was provided with both written and video educational materials regarding genetic testing.    Navigator follow-up:   The patient declined genetic testing at the time of her assessment.  I sent the patient a [facility] message with further information regarding our genetic testing program, asking for a call to discuss assessment results.

## 2025-07-09 ENCOUNTER — DOCUMENTATION (OUTPATIENT)
Dept: GENETICS | Facility: HOSPITAL | Age: 60
End: 2025-07-09
Payer: COMMERCIAL

## 2025-07-09 NOTE — PROGRESS NOTES
I have attempted to contact the patient via [email or web address] message to discuss the risk assessment results. The patient has not yet responded. My contact information was included in the [email or web address] message.

## 2025-07-11 ENCOUNTER — HOSPITAL ENCOUNTER (OUTPATIENT)
Facility: HOSPITAL | Age: 60
Discharge: HOME | End: 2025-07-11
Admitting: STUDENT IN AN ORGANIZED HEALTH CARE EDUCATION/TRAINING PROGRAM
Payer: COMMERCIAL

## 2025-07-11 DIAGNOSIS — Z12.31 SCREENING MAMMOGRAM FOR BREAST CANCER: ICD-10-CM

## 2025-07-11 PROCEDURE — 77067 SCR MAMMO BI INCL CAD: CPT

## 2025-07-11 PROCEDURE — 77063 BREAST TOMOSYNTHESIS BI: CPT
